# Patient Record
Sex: MALE | Race: WHITE | Employment: OTHER | URBAN - METROPOLITAN AREA
[De-identification: names, ages, dates, MRNs, and addresses within clinical notes are randomized per-mention and may not be internally consistent; named-entity substitution may affect disease eponyms.]

---

## 2019-09-30 ENCOUNTER — HOSPITAL ENCOUNTER (EMERGENCY)
Age: 84
Discharge: HOME | End: 2019-09-30
Attending: EMERGENCY MEDICINE
Payer: MEDICARE

## 2019-09-30 VITALS
HEART RATE: 68 BPM | BODY MASS INDEX: 27.92 KG/M2 | OXYGEN SATURATION: 97 % | HEIGHT: 70 IN | TEMPERATURE: 98 F | WEIGHT: 195 LBS | DIASTOLIC BLOOD PRESSURE: 65 MMHG | RESPIRATION RATE: 16 BRPM | SYSTOLIC BLOOD PRESSURE: 128 MMHG

## 2019-09-30 DIAGNOSIS — S41.112A LACERATION OF ARM, LEFT, INITIAL ENCOUNTER: Primary | ICD-10-CM

## 2019-09-30 DIAGNOSIS — S41.112A SKIN TEAR OF LEFT UPPER EXTREMITY: ICD-10-CM

## 2019-09-30 PROCEDURE — 99282 EMERGENCY DEPT VISIT SF MDM: CPT

## 2019-09-30 PROCEDURE — 12004 RPR S/N/AX/GEN/TRK7.6-12.5CM: CPT

## 2019-09-30 RX ORDER — LIDOCAINE HYDROCHLORIDE AND EPINEPHRINE 10; 10 MG/ML; UG/ML
20 INJECTION, SOLUTION INFILTRATION; PERINEURAL ONCE
Status: DISCONTINUED | OUTPATIENT
Start: 2019-09-30 | End: 2019-09-30 | Stop reason: HOSPADM

## 2019-09-30 ASSESSMENT — PAIN DESCRIPTION - LOCATION: LOCATION: ARM

## 2019-09-30 ASSESSMENT — PAIN DESCRIPTION - PAIN TYPE: TYPE: ACUTE PAIN

## 2019-09-30 ASSESSMENT — PAIN DESCRIPTION - ORIENTATION: ORIENTATION: LEFT;LOWER

## 2019-09-30 ASSESSMENT — PAIN DESCRIPTION - DESCRIPTORS: DESCRIPTORS: THROBBING

## 2019-09-30 ASSESSMENT — PAIN SCALES - GENERAL: PAINLEVEL_OUTOF10: 5

## 2020-06-17 ENCOUNTER — HOSPITAL ENCOUNTER (OUTPATIENT)
Dept: GENERAL RADIOLOGY | Age: 85
Discharge: HOME | End: 2020-06-19
Payer: MEDICARE

## 2020-06-17 ENCOUNTER — HOSPITAL ENCOUNTER (OUTPATIENT)
Age: 85
Discharge: HOME | End: 2020-06-19
Payer: MEDICARE

## 2020-06-17 PROCEDURE — 71100 X-RAY EXAM RIBS UNI 2 VIEWS: CPT

## 2020-07-31 ENCOUNTER — HOSPITAL ENCOUNTER (OUTPATIENT)
Dept: CT IMAGING | Age: 85
Discharge: HOME | End: 2020-08-02
Payer: MEDICARE

## 2020-07-31 ENCOUNTER — HOSPITAL ENCOUNTER (OUTPATIENT)
Dept: GENERAL RADIOLOGY | Age: 85
Discharge: HOME | End: 2020-08-02
Payer: MEDICARE

## 2020-07-31 ENCOUNTER — HOSPITAL ENCOUNTER (OUTPATIENT)
Age: 85
Discharge: HOME | End: 2020-08-02
Payer: MEDICARE

## 2020-07-31 PROCEDURE — 70450 CT HEAD/BRAIN W/O DYE: CPT

## 2020-07-31 PROCEDURE — 71100 X-RAY EXAM RIBS UNI 2 VIEWS: CPT

## 2021-01-20 ENCOUNTER — IMMUNIZATION (OUTPATIENT)
Dept: PRIMARY CARE CLINIC | Age: 86
End: 2021-01-20
Payer: MEDICARE

## 2021-01-20 PROCEDURE — 91300 COVID-19, PFIZER VACCINE 30MCG/0.3ML DOSE: CPT | Performed by: NURSE PRACTITIONER

## 2021-01-20 PROCEDURE — 0001A COVID-19, PFIZER VACCINE 30MCG/0.3ML DOSE: CPT | Performed by: NURSE PRACTITIONER

## 2021-02-10 ENCOUNTER — IMMUNIZATION (OUTPATIENT)
Dept: PRIMARY CARE CLINIC | Age: 86
End: 2021-02-10
Payer: MEDICARE

## 2021-02-10 PROCEDURE — 91300 COVID-19, PFIZER VACCINE 30MCG/0.3ML DOSE: CPT | Performed by: NURSE PRACTITIONER

## 2021-02-10 PROCEDURE — 0002A COVID-19, PFIZER VACCINE 30MCG/0.3ML DOSE: CPT | Performed by: NURSE PRACTITIONER

## 2022-04-14 ENCOUNTER — IMMUNIZATION (OUTPATIENT)
Dept: PRIMARY CARE CLINIC | Age: 87
End: 2022-04-14
Payer: MEDICARE

## 2022-04-14 PROCEDURE — 91305 COVID-19, PFIZER GRAY TOP, DO NOT DILUTE, TRIS-SUCROSE, 12+ YRS, PF, 30MCG/ 0.3 ML DOSE: CPT | Performed by: FAMILY MEDICINE

## 2022-04-14 PROCEDURE — 0054A COVID-19, PFIZER GRAY TOP, DO NOT DILUTE, TRIS-SUCROSE, 12+ YRS, PF, 30MCG/ 0.3 ML DOSE: CPT | Performed by: FAMILY MEDICINE

## 2023-01-01 ENCOUNTER — APPOINTMENT (OUTPATIENT)
Dept: GENERAL RADIOLOGY | Age: 88
DRG: 023 | End: 2023-01-01
Payer: MEDICARE

## 2023-01-01 ENCOUNTER — APPOINTMENT (OUTPATIENT)
Dept: CT IMAGING | Age: 88
DRG: 023 | End: 2023-01-01
Payer: MEDICARE

## 2023-01-01 ENCOUNTER — APPOINTMENT (OUTPATIENT)
Dept: INTERVENTIONAL RADIOLOGY/VASCULAR | Age: 88
DRG: 023 | End: 2023-01-01
Payer: MEDICARE

## 2023-01-01 ENCOUNTER — HOSPITAL ENCOUNTER (INPATIENT)
Age: 88
LOS: 5 days | DRG: 023 | End: 2023-03-30
Attending: EMERGENCY MEDICINE | Admitting: INTERNAL MEDICINE
Payer: MEDICARE

## 2023-01-01 ENCOUNTER — APPOINTMENT (OUTPATIENT)
Dept: MRI IMAGING | Age: 88
DRG: 023 | End: 2023-01-01
Payer: MEDICARE

## 2023-01-01 VITALS
TEMPERATURE: 100.3 F | HEART RATE: 89 BPM | BODY MASS INDEX: 29.76 KG/M2 | HEIGHT: 70 IN | RESPIRATION RATE: 30 BRPM | DIASTOLIC BLOOD PRESSURE: 56 MMHG | OXYGEN SATURATION: 96 % | WEIGHT: 207.89 LBS | SYSTOLIC BLOOD PRESSURE: 164 MMHG

## 2023-01-01 DIAGNOSIS — I63.12 EMBOLIC STROKE INVOLVING BASILAR ARTERY (HCC): ICD-10-CM

## 2023-01-01 DIAGNOSIS — I63.219 VERTEBRAL ARTERY STROKE (HCC): Primary | ICD-10-CM

## 2023-01-01 LAB
AADO2: 121.1 MMHG
AADO2: 133.5 MMHG
AADO2: 144.9 MMHG
AADO2: 152.7 MMHG
AADO2: 88.3 MMHG
AADO2: 90.9 MMHG
ABO + RH BLD: NORMAL
ALBUMIN SERPL-MCNC: 2.7 G/DL (ref 3.5–5.2)
ALBUMIN SERPL-MCNC: 2.9 G/DL (ref 3.5–5.2)
ALBUMIN SERPL-MCNC: 3 G/DL (ref 3.5–5.2)
ALBUMIN SERPL-MCNC: 3.1 G/DL (ref 3.5–5.2)
ALBUMIN SERPL-MCNC: 3.3 G/DL (ref 3.5–5.2)
ALBUMIN SERPL-MCNC: 4.1 G/DL (ref 3.5–5.2)
ALP SERPL-CCNC: 64 U/L (ref 40–129)
ALP SERPL-CCNC: 70 U/L (ref 40–129)
ALP SERPL-CCNC: 72 U/L (ref 40–129)
ALP SERPL-CCNC: 90 U/L (ref 40–129)
ALP SERPL-CCNC: 91 U/L (ref 40–129)
ALP SERPL-CCNC: 93 U/L (ref 40–129)
ALT SERPL-CCNC: 11 U/L (ref 0–40)
ALT SERPL-CCNC: 13 U/L (ref 0–40)
ALT SERPL-CCNC: 13 U/L (ref 0–40)
ALT SERPL-CCNC: 16 U/L (ref 0–40)
ALT SERPL-CCNC: 30 U/L (ref 0–40)
ALT SERPL-CCNC: 40 U/L (ref 0–40)
ANION GAP SERPL CALCULATED.3IONS-SCNC: 10 MMOL/L (ref 7–16)
ANION GAP SERPL CALCULATED.3IONS-SCNC: 12 MMOL/L (ref 7–16)
ANION GAP SERPL CALCULATED.3IONS-SCNC: 7 MMOL/L (ref 7–16)
ANION GAP SERPL CALCULATED.3IONS-SCNC: 8 MMOL/L (ref 7–16)
ANION GAP SERPL CALCULATED.3IONS-SCNC: 8 MMOL/L (ref 7–16)
ANION GAP SERPL CALCULATED.3IONS-SCNC: 9 MMOL/L (ref 7–16)
ANISOCYTOSIS: ABNORMAL
ANISOCYTOSIS: ABNORMAL
APTT BLD: 25.3 SEC (ref 24.5–35.1)
AST SERPL-CCNC: 15 U/L (ref 0–39)
AST SERPL-CCNC: 16 U/L (ref 0–39)
AST SERPL-CCNC: 17 U/L (ref 0–39)
AST SERPL-CCNC: 19 U/L (ref 0–39)
AST SERPL-CCNC: 36 U/L (ref 0–39)
AST SERPL-CCNC: 38 U/L (ref 0–39)
B.E.: -0.9 MMOL/L (ref -3–3)
B.E.: -1.8 MMOL/L (ref -3–3)
B.E.: -2.1 MMOL/L (ref -3–3)
B.E.: -2.6 MMOL/L (ref -3–3)
B.E.: -4.4 MMOL/L (ref -3–3)
B.E.: 1.1 MMOL/L (ref -3–3)
BASOPHILS # BLD: 0 E9/L (ref 0–0.2)
BASOPHILS # BLD: 0 E9/L (ref 0–0.2)
BASOPHILS # BLD: 0.03 E9/L (ref 0–0.2)
BASOPHILS # BLD: 0.03 E9/L (ref 0–0.2)
BASOPHILS # BLD: 0.04 E9/L (ref 0–0.2)
BASOPHILS # BLD: 0.07 E9/L (ref 0–0.2)
BASOPHILS NFR BLD: 0.2 % (ref 0–2)
BASOPHILS NFR BLD: 0.2 % (ref 0–2)
BASOPHILS NFR BLD: 0.3 % (ref 0–2)
BASOPHILS NFR BLD: 0.4 % (ref 0–2)
BASOPHILS NFR BLD: 0.8 % (ref 0–2)
BASOPHILS NFR BLD: 1 % (ref 0–2)
BILIRUB SERPL-MCNC: 0.8 MG/DL (ref 0–1.2)
BILIRUB SERPL-MCNC: 0.9 MG/DL (ref 0–1.2)
BILIRUB SERPL-MCNC: 0.9 MG/DL (ref 0–1.2)
BILIRUB SERPL-MCNC: 1.3 MG/DL (ref 0–1.2)
BLD GP AB SCN SERPL QL: NORMAL
BUN SERPL-MCNC: 18 MG/DL (ref 6–23)
BUN SERPL-MCNC: 19 MG/DL (ref 6–23)
BUN SERPL-MCNC: 23 MG/DL (ref 6–23)
BUN SERPL-MCNC: 32 MG/DL (ref 6–23)
BUN SERPL-MCNC: 37 MG/DL (ref 6–23)
BUN SERPL-MCNC: 42 MG/DL (ref 6–23)
BURR CELLS: ABNORMAL
CA-I BLD-SCNC: 1.21 MMOL/L (ref 1.15–1.33)
CA-I BLD-SCNC: 1.27 MMOL/L (ref 1.15–1.33)
CA-I BLD-SCNC: 1.34 MMOL/L (ref 1.15–1.33)
CA-I BLD-SCNC: 1.34 MMOL/L (ref 1.15–1.33)
CA-I BLD-SCNC: 1.35 MMOL/L (ref 1.15–1.33)
CALCIUM SERPL-MCNC: 8.4 MG/DL (ref 8.6–10.2)
CALCIUM SERPL-MCNC: 8.4 MG/DL (ref 8.6–10.2)
CALCIUM SERPL-MCNC: 8.6 MG/DL (ref 8.6–10.2)
CALCIUM SERPL-MCNC: 8.8 MG/DL (ref 8.6–10.2)
CALCIUM SERPL-MCNC: 8.8 MG/DL (ref 8.6–10.2)
CALCIUM SERPL-MCNC: 9.3 MG/DL (ref 8.6–10.2)
CHLORIDE SERPL-SCNC: 104 MMOL/L (ref 98–107)
CHLORIDE SERPL-SCNC: 110 MMOL/L (ref 98–107)
CHLORIDE SERPL-SCNC: 112 MMOL/L (ref 98–107)
CHLORIDE SERPL-SCNC: 117 MMOL/L (ref 98–107)
CHLORIDE SERPL-SCNC: 120 MMOL/L (ref 98–107)
CHLORIDE SERPL-SCNC: 123 MMOL/L (ref 98–107)
CHOLESTEROL, TOTAL: 131 MG/DL (ref 0–199)
CO2 SERPL-SCNC: 20 MMOL/L (ref 22–29)
CO2 SERPL-SCNC: 20 MMOL/L (ref 22–29)
CO2 SERPL-SCNC: 21 MMOL/L (ref 22–29)
CO2 SERPL-SCNC: 24 MMOL/L (ref 22–29)
CO2 SERPL-SCNC: 24 MMOL/L (ref 22–29)
CO2 SERPL-SCNC: 26 MMOL/L (ref 22–29)
COHB: 0.1 % (ref 0–1.5)
COHB: 0.1 % (ref 0–1.5)
COHB: 0.3 % (ref 0–1.5)
COHB: 0.7 % (ref 0–1.5)
COHB: 0.7 % (ref 0–1.5)
COHB: 1 % (ref 0–1.5)
CREAT SERPL-MCNC: 0.9 MG/DL (ref 0.7–1.2)
CREAT SERPL-MCNC: 1 MG/DL (ref 0.7–1.2)
CRITICAL: ABNORMAL
DATE ANALYZED: ABNORMAL
DATE OF COLLECTION: ABNORMAL
EKG ATRIAL RATE: 73 BPM
EKG P AXIS: 49 DEGREES
EKG P-R INTERVAL: 192 MS
EKG Q-T INTERVAL: 404 MS
EKG QRS DURATION: 86 MS
EKG QTC CALCULATION (BAZETT): 445 MS
EKG R AXIS: 2 DEGREES
EKG T AXIS: 40 DEGREES
EKG VENTRICULAR RATE: 73 BPM
EOSINOPHIL # BLD: 0 E9/L (ref 0.05–0.5)
EOSINOPHIL # BLD: 0.01 E9/L (ref 0.05–0.5)
EOSINOPHIL # BLD: 0.11 E9/L (ref 0.05–0.5)
EOSINOPHIL NFR BLD: 0 % (ref 0–6)
EOSINOPHIL NFR BLD: 0.1 % (ref 0–6)
EOSINOPHIL NFR BLD: 1.5 % (ref 0–6)
ERYTHROCYTE [DISTWIDTH] IN BLOOD BY AUTOMATED COUNT: 13.2 FL (ref 11.5–15)
ERYTHROCYTE [DISTWIDTH] IN BLOOD BY AUTOMATED COUNT: 13.6 FL (ref 11.5–15)
ERYTHROCYTE [DISTWIDTH] IN BLOOD BY AUTOMATED COUNT: 14.2 FL (ref 11.5–15)
ERYTHROCYTE [DISTWIDTH] IN BLOOD BY AUTOMATED COUNT: 14.6 FL (ref 11.5–15)
ERYTHROCYTE [DISTWIDTH] IN BLOOD BY AUTOMATED COUNT: 14.7 FL (ref 11.5–15)
ERYTHROCYTE [DISTWIDTH] IN BLOOD BY AUTOMATED COUNT: 15 FL (ref 11.5–15)
FIO2: 40 %
FIO2: 50 %
GLUCOSE BLD-MCNC: 140 MG/DL
GLUCOSE SERPL-MCNC: 133 MG/DL (ref 74–99)
GLUCOSE SERPL-MCNC: 141 MG/DL (ref 74–99)
GLUCOSE SERPL-MCNC: 144 MG/DL (ref 74–99)
GLUCOSE SERPL-MCNC: 145 MG/DL (ref 74–99)
GLUCOSE SERPL-MCNC: 159 MG/DL (ref 74–99)
GLUCOSE SERPL-MCNC: 173 MG/DL (ref 74–99)
HBA1C MFR BLD: 5.3 % (ref 4–5.6)
HCO3: 20.6 MMOL/L (ref 22–26)
HCO3: 20.7 MMOL/L (ref 22–26)
HCO3: 21.1 MMOL/L (ref 22–26)
HCO3: 21.7 MMOL/L (ref 22–26)
HCO3: 22.5 MMOL/L (ref 22–26)
HCO3: 24.1 MMOL/L (ref 22–26)
HCT VFR BLD AUTO: 31.8 % (ref 37–54)
HCT VFR BLD AUTO: 33.5 % (ref 37–54)
HCT VFR BLD AUTO: 34.4 % (ref 37–54)
HCT VFR BLD AUTO: 35.3 % (ref 37–54)
HCT VFR BLD AUTO: 36.3 % (ref 37–54)
HCT VFR BLD AUTO: 44 % (ref 37–54)
HDLC SERPL-MCNC: 41 MG/DL
HGB BLD-MCNC: 10.4 G/DL (ref 12.5–16.5)
HGB BLD-MCNC: 10.9 G/DL (ref 12.5–16.5)
HGB BLD-MCNC: 11.4 G/DL (ref 12.5–16.5)
HGB BLD-MCNC: 11.9 G/DL (ref 12.5–16.5)
HGB BLD-MCNC: 12.4 G/DL (ref 12.5–16.5)
HGB BLD-MCNC: 15 G/DL (ref 12.5–16.5)
HHB: 1.1 % (ref 0–5)
HHB: 1.3 % (ref 0–5)
HHB: 1.5 % (ref 0–5)
HHB: 1.8 % (ref 0–5)
HHB: 3.1 % (ref 0–5)
HHB: 3.3 % (ref 0–5)
IMM GRANULOCYTES # BLD: 0.05 E9/L
IMM GRANULOCYTES # BLD: 0.09 E9/L
IMM GRANULOCYTES # BLD: 0.15 E9/L
IMM GRANULOCYTES # BLD: 0.17 E9/L
IMM GRANULOCYTES NFR BLD: 0.6 % (ref 0–5)
IMM GRANULOCYTES NFR BLD: 0.7 % (ref 0–5)
IMM GRANULOCYTES NFR BLD: 1.3 % (ref 0–5)
IMM GRANULOCYTES NFR BLD: 1.7 % (ref 0–5)
INR BLD: 1.1
LAB: ABNORMAL
LDLC SERPL CALC-MCNC: 73 MG/DL (ref 0–99)
LV EF: 65 %
LVEF MODALITY: NORMAL
LYMPHOCYTES # BLD: 0.32 E9/L (ref 1.5–4)
LYMPHOCYTES # BLD: 0.43 E9/L (ref 1.5–4)
LYMPHOCYTES # BLD: 0.44 E9/L (ref 1.5–4)
LYMPHOCYTES # BLD: 0.53 E9/L (ref 1.5–4)
LYMPHOCYTES # BLD: 0.7 E9/L (ref 1.5–4)
LYMPHOCYTES # BLD: 1.74 E9/L (ref 1.5–4)
LYMPHOCYTES NFR BLD: 1.7 % (ref 20–42)
LYMPHOCYTES NFR BLD: 2.6 % (ref 20–42)
LYMPHOCYTES NFR BLD: 24.2 % (ref 20–42)
LYMPHOCYTES NFR BLD: 3.6 % (ref 20–42)
LYMPHOCYTES NFR BLD: 4.5 % (ref 20–42)
LYMPHOCYTES NFR BLD: 4.7 % (ref 20–42)
Lab: ABNORMAL
MAGNESIUM SERPL-MCNC: 1.9 MG/DL (ref 1.6–2.6)
MAGNESIUM SERPL-MCNC: 2 MG/DL (ref 1.6–2.6)
MAGNESIUM SERPL-MCNC: 2.2 MG/DL (ref 1.6–2.6)
MAGNESIUM SERPL-MCNC: 2.2 MG/DL (ref 1.6–2.6)
MCH RBC QN AUTO: 32.2 PG (ref 26–35)
MCH RBC QN AUTO: 32.3 PG (ref 26–35)
MCH RBC QN AUTO: 32.4 PG (ref 26–35)
MCH RBC QN AUTO: 32.4 PG (ref 26–35)
MCH RBC QN AUTO: 32.5 PG (ref 26–35)
MCH RBC QN AUTO: 32.7 PG (ref 26–35)
MCHC RBC AUTO-ENTMCNC: 32.5 % (ref 32–34.5)
MCHC RBC AUTO-ENTMCNC: 32.7 % (ref 32–34.5)
MCHC RBC AUTO-ENTMCNC: 33.1 % (ref 32–34.5)
MCHC RBC AUTO-ENTMCNC: 33.7 % (ref 32–34.5)
MCHC RBC AUTO-ENTMCNC: 34.1 % (ref 32–34.5)
MCHC RBC AUTO-ENTMCNC: 34.2 % (ref 32–34.5)
MCV RBC AUTO: 94.4 FL (ref 80–99.9)
MCV RBC AUTO: 95 FL (ref 80–99.9)
MCV RBC AUTO: 96.2 FL (ref 80–99.9)
MCV RBC AUTO: 98.6 FL (ref 80–99.9)
MCV RBC AUTO: 99.1 FL (ref 80–99.9)
MCV RBC AUTO: 99.4 FL (ref 80–99.9)
METAMYELOCYTES NFR BLD MANUAL: 0.9 % (ref 0–1)
METER GLUCOSE: 140 MG/DL (ref 74–99)
METHB: 0.3 % (ref 0–1.5)
METHB: 0.3 % (ref 0–1.5)
METHB: 0.4 % (ref 0–1.5)
METHB: 0.4 % (ref 0–1.5)
METHB: 0.5 % (ref 0–1.5)
METHB: 0.7 % (ref 0–1.5)
MODE: AC
MONOCYTES # BLD: 0.79 E9/L (ref 0.1–0.95)
MONOCYTES # BLD: 1.12 E9/L (ref 0.1–0.95)
MONOCYTES # BLD: 1.16 E9/L (ref 0.1–0.95)
MONOCYTES # BLD: 1.24 E9/L (ref 0.1–0.95)
MONOCYTES # BLD: 1.34 E9/L (ref 0.1–0.95)
MONOCYTES # BLD: 2.11 E9/L (ref 0.1–0.95)
MONOCYTES NFR BLD: 10.3 % (ref 2–12)
MONOCYTES NFR BLD: 11 % (ref 2–12)
MONOCYTES NFR BLD: 11.9 % (ref 2–12)
MONOCYTES NFR BLD: 12.2 % (ref 2–12)
MONOCYTES NFR BLD: 7 % (ref 2–12)
MONOCYTES NFR BLD: 9 % (ref 2–12)
MYELOCYTES NFR BLD MANUAL: 1.7 % (ref 0–0)
NEUTROPHILS # BLD: 10.15 E9/L (ref 1.8–7.3)
NEUTROPHILS # BLD: 12.73 E9/L (ref 1.8–7.3)
NEUTROPHILS # BLD: 14.56 E9/L (ref 1.8–7.3)
NEUTROPHILS # BLD: 14.96 E9/L (ref 1.8–7.3)
NEUTROPHILS # BLD: 4.42 E9/L (ref 1.8–7.3)
NEUTROPHILS # BLD: 7.9 E9/L (ref 1.8–7.3)
NEUTS SEG NFR BLD: 61.6 % (ref 43–80)
NEUTS SEG NFR BLD: 81.4 % (ref 43–80)
NEUTS SEG NFR BLD: 84.5 % (ref 43–80)
NEUTS SEG NFR BLD: 85.2 % (ref 43–80)
NEUTS SEG NFR BLD: 85.5 % (ref 43–80)
NEUTS SEG NFR BLD: 88.7 % (ref 43–80)
O2 CONTENT: 15.7 ML/DL
O2 CONTENT: 16.2 ML/DL
O2 CONTENT: 16.3 ML/DL
O2 CONTENT: 17.8 ML/DL
O2 CONTENT: 17.8 ML/DL
O2 CONTENT: 18.6 ML/DL
O2 SATURATION: 96.7 % (ref 92–98.5)
O2 SATURATION: 96.9 % (ref 92–98.5)
O2 SATURATION: 98.2 % (ref 92–98.5)
O2 SATURATION: 98.5 % (ref 92–98.5)
O2 SATURATION: 98.7 % (ref 92–98.5)
O2 SATURATION: 98.9 % (ref 92–98.5)
O2HB: 95.7 % (ref 94–97)
O2HB: 96.2 % (ref 94–97)
O2HB: 97.1 % (ref 94–97)
O2HB: 97.4 % (ref 94–97)
O2HB: 97.9 % (ref 94–97)
O2HB: 98.1 % (ref 94–97)
OPERATOR ID: 1632
OPERATOR ID: 2244
OPERATOR ID: 789
OPERATOR ID: ABNORMAL
OVALOCYTES: ABNORMAL
PATIENT TEMP: 37 C
PCO2: 30 MMHG (ref 35–45)
PCO2: 32.8 MMHG (ref 35–45)
PCO2: 33.2 MMHG (ref 35–45)
PCO2: 33.4 MMHG (ref 35–45)
PCO2: 33.4 MMHG (ref 35–45)
PCO2: 37.6 MMHG (ref 35–45)
PEEP/CPAP: 5 CMH2O
PEEP/CPAP: 8 CMH2O
PFO2: 2.1 MMHG/%
PFO2: 2.3 MMHG/%
PFO2: 2.91 MMHG/%
PFO2: 3.36 MMHG/%
PFO2: 3.72 MMHG/%
PFO2: 3.75 MMHG/%
PH BLOOD GAS: 7.36 (ref 7.35–7.45)
PH BLOOD GAS: 7.42 (ref 7.35–7.45)
PH BLOOD GAS: 7.44 (ref 7.35–7.45)
PH BLOOD GAS: 7.45 (ref 7.35–7.45)
PH BLOOD GAS: 7.46 (ref 7.35–7.45)
PH BLOOD GAS: 7.48 (ref 7.35–7.45)
PHOSPHATE SERPL-MCNC: 1.4 MG/DL (ref 2.5–4.5)
PHOSPHATE SERPL-MCNC: 2.2 MG/DL (ref 2.5–4.5)
PHOSPHATE SERPL-MCNC: 3.1 MG/DL (ref 2.5–4.5)
PHOSPHATE SERPL-MCNC: 3.2 MG/DL (ref 2.5–4.5)
PHOSPHATE SERPL-MCNC: 3.2 MG/DL (ref 2.5–4.5)
PLATELET # BLD AUTO: 137 E9/L (ref 130–450)
PLATELET # BLD AUTO: 161 E9/L (ref 130–450)
PLATELET # BLD AUTO: 177 E9/L (ref 130–450)
PLATELET # BLD AUTO: 183 E9/L (ref 130–450)
PLATELET # BLD AUTO: 192 E9/L (ref 130–450)
PLATELET # BLD AUTO: 212 E9/L (ref 130–450)
PMV BLD AUTO: 11.9 FL (ref 7–12)
PMV BLD AUTO: 11.9 FL (ref 7–12)
PMV BLD AUTO: 12.2 FL (ref 7–12)
PMV BLD AUTO: 12.3 FL (ref 7–12)
PO2: 116.4 MMHG (ref 75–100)
PO2: 148.7 MMHG (ref 75–100)
PO2: 149.8 MMHG (ref 75–100)
PO2: 168.2 MMHG (ref 75–100)
PO2: 84.1 MMHG (ref 75–100)
PO2: 91.9 MMHG (ref 75–100)
POIKILOCYTES: ABNORMAL
POLYCHROMASIA: ABNORMAL
POTASSIUM SERPL-SCNC: 3.5 MMOL/L (ref 3.5–5)
POTASSIUM SERPL-SCNC: 3.5 MMOL/L (ref 3.5–5)
POTASSIUM SERPL-SCNC: 3.7 MMOL/L (ref 3.5–5)
POTASSIUM SERPL-SCNC: 3.7 MMOL/L (ref 3.5–5)
POTASSIUM SERPL-SCNC: 3.9 MMOL/L (ref 3.5–5)
POTASSIUM SERPL-SCNC: 4 MMOL/L (ref 3.5–5)
PROCALCITONIN: 0.4 NG/ML (ref 0–0.08)
PROT SERPL-MCNC: 4.8 G/DL (ref 6.4–8.3)
PROT SERPL-MCNC: 5.1 G/DL (ref 6.4–8.3)
PROT SERPL-MCNC: 5.1 G/DL (ref 6.4–8.3)
PROT SERPL-MCNC: 5.2 G/DL (ref 6.4–8.3)
PROT SERPL-MCNC: 5.2 G/DL (ref 6.4–8.3)
PROT SERPL-MCNC: 6.2 G/DL (ref 6.4–8.3)
PROTHROMBIN TIME: 11.7 SEC (ref 9.3–12.4)
RBC # BLD AUTO: 3.21 E12/L (ref 3.8–5.8)
RBC # BLD AUTO: 3.37 E12/L (ref 3.8–5.8)
RBC # BLD AUTO: 3.49 E12/L (ref 3.8–5.8)
RBC # BLD AUTO: 3.67 E12/L (ref 3.8–5.8)
RBC # BLD AUTO: 3.82 E12/L (ref 3.8–5.8)
RBC # BLD AUTO: 4.66 E12/L (ref 3.8–5.8)
RI(T): 0.59
RI(T): 0.61
RI(T): 0.79
RI(T): 1.04
RI(T): 1.58
RI(T): 1.82
RR MECHANICAL: 14 B/MIN
RR MECHANICAL: 16 B/MIN
SODIUM SERPL-SCNC: 139 MMOL/L (ref 132–146)
SODIUM SERPL-SCNC: 142 MMOL/L (ref 132–146)
SODIUM SERPL-SCNC: 142 MMOL/L (ref 132–146)
SODIUM SERPL-SCNC: 146 MMOL/L (ref 132–146)
SODIUM SERPL-SCNC: 146 MMOL/L (ref 132–146)
SODIUM SERPL-SCNC: 147 MMOL/L (ref 132–146)
SODIUM SERPL-SCNC: 148 MMOL/L (ref 132–146)
SODIUM SERPL-SCNC: 150 MMOL/L (ref 132–146)
SODIUM SERPL-SCNC: 151 MMOL/L (ref 132–146)
SODIUM SERPL-SCNC: 151 MMOL/L (ref 132–146)
SODIUM SERPL-SCNC: 155 MMOL/L (ref 132–146)
SODIUM SERPL-SCNC: 155 MMOL/L (ref 132–146)
SODIUM SERPL-SCNC: 157 MMOL/L (ref 132–146)
SODIUM SERPL-SCNC: 159 MMOL/L (ref 132–146)
SOURCE, BLOOD GAS: ABNORMAL
TARGET CELLS: ABNORMAL
THB: 11.5 G/DL (ref 11.5–16.5)
THB: 11.8 G/DL (ref 11.5–16.5)
THB: 12 G/DL (ref 11.5–16.5)
THB: 12.7 G/DL (ref 11.5–16.5)
THB: 12.8 G/DL (ref 11.5–16.5)
THB: 13.3 G/DL (ref 11.5–16.5)
TIME ANALYZED: 1423
TIME ANALYZED: 436
TIME ANALYZED: 505
TIME ANALYZED: 529
TIME ANALYZED: 545
TIME ANALYZED: 623
TRIGL SERPL-MCNC: 69 MG/DL (ref 0–149)
TRIGL SERPL-MCNC: 83 MG/DL (ref 0–149)
TROPONIN, HIGH SENSITIVITY: 20 NG/L (ref 0–11)
VLDLC SERPL CALC-MCNC: 17 MG/DL
VT MECHANICAL: 500 ML
WBC # BLD: 12 E9/L (ref 4.5–11.5)
WBC # BLD: 14.9 E9/L (ref 4.5–11.5)
WBC # BLD: 16 E9/L (ref 4.5–11.5)
WBC # BLD: 17.6 E9/L (ref 4.5–11.5)
WBC # BLD: 7.2 E9/L (ref 4.5–11.5)
WBC # BLD: 9.7 E9/L (ref 4.5–11.5)

## 2023-01-01 PROCEDURE — 2580000003 HC RX 258: Performed by: NURSE PRACTITIONER

## 2023-01-01 PROCEDURE — 37799 UNLISTED PX VASCULAR SURGERY: CPT

## 2023-01-01 PROCEDURE — 83735 ASSAY OF MAGNESIUM: CPT

## 2023-01-01 PROCEDURE — 94003 VENT MGMT INPAT SUBQ DAY: CPT

## 2023-01-01 PROCEDURE — 2580000003 HC RX 258: Performed by: PSYCHIATRY & NEUROLOGY

## 2023-01-01 PROCEDURE — C1874 STENT, COATED/COV W/DEL SYS: HCPCS

## 2023-01-01 PROCEDURE — 4A03X5D MEASUREMENT OF ARTERIAL FLOW, INTRACRANIAL, EXTERNAL APPROACH: ICD-10-PCS | Performed by: PSYCHIATRY & NEUROLOGY

## 2023-01-01 PROCEDURE — 2500000003 HC RX 250 WO HCPCS: Performed by: NURSE PRACTITIONER

## 2023-01-01 PROCEDURE — 85730 THROMBOPLASTIN TIME PARTIAL: CPT

## 2023-01-01 PROCEDURE — 80053 COMPREHEN METABOLIC PANEL: CPT

## 2023-01-01 PROCEDURE — 6360000002 HC RX W HCPCS: Performed by: PSYCHIATRY & NEUROLOGY

## 2023-01-01 PROCEDURE — 82330 ASSAY OF CALCIUM: CPT

## 2023-01-01 PROCEDURE — 6360000002 HC RX W HCPCS: Performed by: ANESTHESIOLOGY

## 2023-01-01 PROCEDURE — 99291 CRITICAL CARE FIRST HOUR: CPT | Performed by: NURSE PRACTITIONER

## 2023-01-01 PROCEDURE — 84100 ASSAY OF PHOSPHORUS: CPT

## 2023-01-01 PROCEDURE — 2500000003 HC RX 250 WO HCPCS: Performed by: PSYCHIATRY & NEUROLOGY

## 2023-01-01 PROCEDURE — 6360000002 HC RX W HCPCS

## 2023-01-01 PROCEDURE — A4216 STERILE WATER/SALINE, 10 ML: HCPCS | Performed by: NURSE PRACTITIONER

## 2023-01-01 PROCEDURE — 84478 ASSAY OF TRIGLYCERIDES: CPT

## 2023-01-01 PROCEDURE — 6370000000 HC RX 637 (ALT 250 FOR IP): Performed by: PSYCHIATRY & NEUROLOGY

## 2023-01-01 PROCEDURE — 36226 PLACE CATH VERTEBRAL ART: CPT | Performed by: PSYCHIATRY & NEUROLOGY

## 2023-01-01 PROCEDURE — 2500000003 HC RX 250 WO HCPCS: Performed by: CLINICAL NURSE SPECIALIST

## 2023-01-01 PROCEDURE — 36569 INSJ PICC 5 YR+ W/O IMAGING: CPT

## 2023-01-01 PROCEDURE — 85610 PROTHROMBIN TIME: CPT

## 2023-01-01 PROCEDURE — 82805 BLOOD GASES W/O2 SATURATION: CPT

## 2023-01-01 PROCEDURE — 6370000000 HC RX 637 (ALT 250 FOR IP): Performed by: CLINICAL NURSE SPECIALIST

## 2023-01-01 PROCEDURE — 70553 MRI BRAIN STEM W/O & W/DYE: CPT

## 2023-01-01 PROCEDURE — 87206 SMEAR FLUORESCENT/ACID STAI: CPT

## 2023-01-01 PROCEDURE — 86900 BLOOD TYPING SEROLOGIC ABO: CPT

## 2023-01-01 PROCEDURE — 36415 COLL VENOUS BLD VENIPUNCTURE: CPT

## 2023-01-01 PROCEDURE — 99232 SBSQ HOSP IP/OBS MODERATE 35: CPT

## 2023-01-01 PROCEDURE — 03CG3ZZ EXTIRPATION OF MATTER FROM INTRACRANIAL ARTERY, PERCUTANEOUS APPROACH: ICD-10-PCS | Performed by: PSYCHIATRY & NEUROLOGY

## 2023-01-01 PROCEDURE — 6360000004 HC RX CONTRAST MEDICATION: Performed by: PSYCHIATRY & NEUROLOGY

## 2023-01-01 PROCEDURE — 6360000002 HC RX W HCPCS: Performed by: NURSE PRACTITIONER

## 2023-01-01 PROCEDURE — C1751 CATH, INF, PER/CENT/MIDLINE: HCPCS

## 2023-01-01 PROCEDURE — 99231 SBSQ HOSP IP/OBS SF/LOW 25: CPT | Performed by: PSYCHIATRY & NEUROLOGY

## 2023-01-01 PROCEDURE — 85025 COMPLETE CBC W/AUTO DIFF WBC: CPT

## 2023-01-01 PROCEDURE — 86850 RBC ANTIBODY SCREEN: CPT

## 2023-01-01 PROCEDURE — 86901 BLOOD TYPING SEROLOGIC RH(D): CPT

## 2023-01-01 PROCEDURE — 71045 X-RAY EXAM CHEST 1 VIEW: CPT

## 2023-01-01 PROCEDURE — 6370000000 HC RX 637 (ALT 250 FOR IP): Performed by: NURSE PRACTITIONER

## 2023-01-01 PROCEDURE — 2000000000 HC ICU R&B

## 2023-01-01 PROCEDURE — B31G1ZZ FLUOROSCOPY OF BILATERAL VERTEBRAL ARTERIES USING LOW OSMOLAR CONTRAST: ICD-10-PCS | Performed by: PSYCHIATRY & NEUROLOGY

## 2023-01-01 PROCEDURE — 2580000003 HC RX 258: Performed by: EMERGENCY MEDICINE

## 2023-01-01 PROCEDURE — 6370000000 HC RX 637 (ALT 250 FOR IP): Performed by: SURGERY

## 2023-01-01 PROCEDURE — 6360000002 HC RX W HCPCS: Performed by: CLINICAL NURSE SPECIALIST

## 2023-01-01 PROCEDURE — 70450 CT HEAD/BRAIN W/O DYE: CPT

## 2023-01-01 PROCEDURE — 84295 ASSAY OF SERUM SODIUM: CPT

## 2023-01-01 PROCEDURE — A9577 INJ MULTIHANCE: HCPCS | Performed by: RADIOLOGY

## 2023-01-01 PROCEDURE — 3700000000 HC ANESTHESIA ATTENDED CARE

## 2023-01-01 PROCEDURE — 5A1955Z RESPIRATORY VENTILATION, GREATER THAN 96 CONSECUTIVE HOURS: ICD-10-PCS | Performed by: INTERNAL MEDICINE

## 2023-01-01 PROCEDURE — 94002 VENT MGMT INPAT INIT DAY: CPT

## 2023-01-01 PROCEDURE — 36225 PLACE CATH SUBCLAVIAN ART: CPT

## 2023-01-01 PROCEDURE — 93010 ELECTROCARDIOGRAM REPORT: CPT | Performed by: INTERNAL MEDICINE

## 2023-01-01 PROCEDURE — 74018 RADEX ABDOMEN 1 VIEW: CPT

## 2023-01-01 PROCEDURE — 0BH17EZ INSERTION OF ENDOTRACHEAL AIRWAY INTO TRACHEA, VIA NATURAL OR ARTIFICIAL OPENING: ICD-10-PCS | Performed by: INTERNAL MEDICINE

## 2023-01-01 PROCEDURE — 61645 PERQ ART M-THROMBECT &/NFS: CPT | Performed by: PSYCHIATRY & NEUROLOGY

## 2023-01-01 PROCEDURE — 84484 ASSAY OF TROPONIN QUANT: CPT

## 2023-01-01 PROCEDURE — 99285 EMERGENCY DEPT VISIT HI MDM: CPT

## 2023-01-01 PROCEDURE — 6360000004 HC RX CONTRAST MEDICATION: Performed by: RADIOLOGY

## 2023-01-01 PROCEDURE — 7100000001 HC PACU RECOVERY - ADDTL 15 MIN

## 2023-01-01 PROCEDURE — 93005 ELECTROCARDIOGRAM TRACING: CPT | Performed by: STUDENT IN AN ORGANIZED HEALTH CARE EDUCATION/TRAINING PROGRAM

## 2023-01-01 PROCEDURE — 80061 LIPID PANEL: CPT

## 2023-01-01 PROCEDURE — 76377 3D RENDER W/INTRP POSTPROCES: CPT

## 2023-01-01 PROCEDURE — 6360000002 HC RX W HCPCS: Performed by: SURGERY

## 2023-01-01 PROCEDURE — 7100000000 HC PACU RECOVERY - FIRST 15 MIN

## 2023-01-01 PROCEDURE — 70498 CT ANGIOGRAPHY NECK: CPT

## 2023-01-01 PROCEDURE — 99222 1ST HOSP IP/OBS MODERATE 55: CPT

## 2023-01-01 PROCEDURE — 99284 EMERGENCY DEPT VISIT MOD MDM: CPT | Performed by: PSYCHIATRY & NEUROLOGY

## 2023-01-01 PROCEDURE — 3700000001 HC ADD 15 MINUTES (ANESTHESIA)

## 2023-01-01 PROCEDURE — 87040 BLOOD CULTURE FOR BACTERIA: CPT

## 2023-01-01 PROCEDURE — 61645 PERQ ART M-THROMBECT &/NFS: CPT

## 2023-01-01 PROCEDURE — 84145 PROCALCITONIN (PCT): CPT

## 2023-01-01 PROCEDURE — 87070 CULTURE OTHR SPECIMN AEROBIC: CPT

## 2023-01-01 PROCEDURE — 99232 SBSQ HOSP IP/OBS MODERATE 35: CPT | Performed by: PSYCHIATRY & NEUROLOGY

## 2023-01-01 PROCEDURE — 82962 GLUCOSE BLOOD TEST: CPT

## 2023-01-01 PROCEDURE — C1760 CLOSURE DEV, VASC: HCPCS

## 2023-01-01 PROCEDURE — 76937 US GUIDE VASCULAR ACCESS: CPT

## 2023-01-01 PROCEDURE — 70496 CT ANGIOGRAPHY HEAD: CPT

## 2023-01-01 PROCEDURE — 2709999900 IR MECHANICAL ART THROMBECTOMY INTRACRANIAL

## 2023-01-01 PROCEDURE — 93306 TTE W/DOPPLER COMPLETE: CPT

## 2023-01-01 PROCEDURE — 037Q34Z DILATION OF LEFT VERTEBRAL ARTERY WITH DRUG-ELUTING INTRALUMINAL DEVICE, PERCUTANEOUS APPROACH: ICD-10-PCS | Performed by: PSYCHIATRY & NEUROLOGY

## 2023-01-01 PROCEDURE — 83036 HEMOGLOBIN GLYCOSYLATED A1C: CPT

## 2023-01-01 PROCEDURE — 99231 SBSQ HOSP IP/OBS SF/LOW 25: CPT | Performed by: NURSE PRACTITIONER

## 2023-01-01 PROCEDURE — 0042T CT BRAIN PERFUSION: CPT

## 2023-01-01 RX ORDER — LABETALOL HYDROCHLORIDE 5 MG/ML
5 INJECTION, SOLUTION INTRAVENOUS EVERY 10 MIN PRN
Status: DISCONTINUED | OUTPATIENT
Start: 2023-01-01 | End: 2023-01-01

## 2023-01-01 RX ORDER — FENTANYL CITRATE 50 UG/ML
INJECTION, SOLUTION INTRAMUSCULAR; INTRAVENOUS
Status: COMPLETED | OUTPATIENT
Start: 2023-01-01 | End: 2023-01-01

## 2023-01-01 RX ORDER — LACTULOSE 10 G/15ML
30 SOLUTION ORAL 2 TIMES DAILY
Status: DISCONTINUED | OUTPATIENT
Start: 2023-01-01 | End: 2023-01-01

## 2023-01-01 RX ORDER — SODIUM CHLORIDE 0.9 % (FLUSH) 0.9 %
5-40 SYRINGE (ML) INJECTION PRN
Status: DISCONTINUED | OUTPATIENT
Start: 2023-01-01 | End: 2023-01-01 | Stop reason: SDUPTHER

## 2023-01-01 RX ORDER — AMLODIPINE BESYLATE 5 MG/1
5 TABLET ORAL DAILY
Status: DISCONTINUED | OUTPATIENT
Start: 2023-01-01 | End: 2023-01-01

## 2023-01-01 RX ORDER — SODIUM CHLORIDE 9 MG/ML
INJECTION, SOLUTION INTRAVENOUS PRN
Status: DISCONTINUED | OUTPATIENT
Start: 2023-01-01 | End: 2023-01-01

## 2023-01-01 RX ORDER — SODIUM CHLORIDE 9 MG/ML
25 INJECTION, SOLUTION INTRAVENOUS PRN
Status: DISCONTINUED | OUTPATIENT
Start: 2023-01-01 | End: 2023-01-01 | Stop reason: SDUPTHER

## 2023-01-01 RX ORDER — AMLODIPINE BESYLATE 5 MG/1
5 TABLET ORAL DAILY
COMMUNITY

## 2023-01-01 RX ORDER — HEPARIN SODIUM 10000 [USP'U]/ML
5000 INJECTION, SOLUTION INTRAVENOUS; SUBCUTANEOUS EVERY 8 HOURS SCHEDULED
Status: DISCONTINUED | OUTPATIENT
Start: 2023-01-01 | End: 2023-01-01

## 2023-01-01 RX ORDER — MAGNESIUM SULFATE IN WATER 40 MG/ML
2000 INJECTION, SOLUTION INTRAVENOUS ONCE
Status: COMPLETED | OUTPATIENT
Start: 2023-01-01 | End: 2023-01-01

## 2023-01-01 RX ORDER — SODIUM CHLORIDE 0.9 % (FLUSH) 0.9 %
5-40 SYRINGE (ML) INJECTION EVERY 12 HOURS SCHEDULED
Status: DISCONTINUED | OUTPATIENT
Start: 2023-01-01 | End: 2023-01-01

## 2023-01-01 RX ORDER — HEPARIN SODIUM (PORCINE) LOCK FLUSH IV SOLN 100 UNIT/ML 100 UNIT/ML
3 SOLUTION INTRAVENOUS EVERY 12 HOURS SCHEDULED
Status: DISCONTINUED | OUTPATIENT
Start: 2023-01-01 | End: 2023-01-01

## 2023-01-01 RX ORDER — PROPOFOL 10 MG/ML
10 INJECTION, EMULSION INTRAVENOUS
Status: DISCONTINUED | OUTPATIENT
Start: 2023-01-01 | End: 2023-01-01

## 2023-01-01 RX ORDER — 0.9 % SODIUM CHLORIDE 0.9 %
500 INTRAVENOUS SOLUTION INTRAVENOUS ONCE
Status: COMPLETED | OUTPATIENT
Start: 2023-01-01 | End: 2023-01-01

## 2023-01-01 RX ORDER — MINERAL OIL AND WHITE PETROLATUM 150; 830 MG/G; MG/G
OINTMENT OPHTHALMIC EVERY 4 HOURS
Status: DISCONTINUED | OUTPATIENT
Start: 2023-01-01 | End: 2023-01-01

## 2023-01-01 RX ORDER — MORPHINE SULFATE 4 MG/ML
3 INJECTION, SOLUTION INTRAMUSCULAR; INTRAVENOUS
Status: DISCONTINUED | OUTPATIENT
Start: 2023-01-01 | End: 2023-01-01 | Stop reason: HOSPADM

## 2023-01-01 RX ORDER — ASPIRIN 81 MG/1
81 TABLET, CHEWABLE ORAL DAILY
Status: DISCONTINUED | OUTPATIENT
Start: 2023-01-01 | End: 2023-01-01

## 2023-01-01 RX ORDER — EPTIFIBATIDE 0.75 MG/ML
0.5 INJECTION, SOLUTION INTRAVENOUS CONTINUOUS
Status: DISPENSED | OUTPATIENT
Start: 2023-01-01 | End: 2023-01-01

## 2023-01-01 RX ORDER — ACETAMINOPHEN 325 MG/1
650 TABLET ORAL
Status: DISCONTINUED | OUTPATIENT
Start: 2023-01-01 | End: 2023-01-01

## 2023-01-01 RX ORDER — HYDRALAZINE HYDROCHLORIDE 20 MG/ML
5 INJECTION INTRAMUSCULAR; INTRAVENOUS
Status: DISCONTINUED | OUTPATIENT
Start: 2023-01-01 | End: 2023-01-01

## 2023-01-01 RX ORDER — 3% SODIUM CHLORIDE 3 G/100ML
25 INJECTION, SOLUTION INTRAVENOUS CONTINUOUS
Status: DISPENSED | OUTPATIENT
Start: 2023-01-01 | End: 2023-01-01

## 2023-01-01 RX ORDER — HEPARIN SODIUM 10000 [USP'U]/ML
INJECTION, SOLUTION INTRAVENOUS; SUBCUTANEOUS
Status: COMPLETED | OUTPATIENT
Start: 2023-01-01 | End: 2023-01-01

## 2023-01-01 RX ORDER — ENOXAPARIN SODIUM 100 MG/ML
40 INJECTION SUBCUTANEOUS DAILY
Status: DISCONTINUED | OUTPATIENT
Start: 2023-01-01 | End: 2023-01-01

## 2023-01-01 RX ORDER — MORPHINE SULFATE 2 MG/ML
2 INJECTION, SOLUTION INTRAMUSCULAR; INTRAVENOUS
Status: DISCONTINUED | OUTPATIENT
Start: 2023-01-01 | End: 2023-01-01

## 2023-01-01 RX ORDER — LIDOCAINE HYDROCHLORIDE 20 MG/ML
INJECTION, SOLUTION INFILTRATION; PERINEURAL
Status: COMPLETED | OUTPATIENT
Start: 2023-01-01 | End: 2023-01-01

## 2023-01-01 RX ORDER — DIPHENHYDRAMINE HYDROCHLORIDE 50 MG/ML
12.5 INJECTION INTRAMUSCULAR; INTRAVENOUS
Status: DISCONTINUED | OUTPATIENT
Start: 2023-01-01 | End: 2023-01-01

## 2023-01-01 RX ORDER — FENTANYL CITRATE 50 UG/ML
25 INJECTION, SOLUTION INTRAMUSCULAR; INTRAVENOUS
Status: DISCONTINUED | OUTPATIENT
Start: 2023-01-01 | End: 2023-01-01

## 2023-01-01 RX ORDER — HEPARIN SODIUM (PORCINE) LOCK FLUSH IV SOLN 100 UNIT/ML 100 UNIT/ML
3 SOLUTION INTRAVENOUS PRN
Status: DISCONTINUED | OUTPATIENT
Start: 2023-01-01 | End: 2023-01-01

## 2023-01-01 RX ORDER — HYDRALAZINE HYDROCHLORIDE 20 MG/ML
10 INJECTION INTRAMUSCULAR; INTRAVENOUS EVERY 10 MIN PRN
Status: DISCONTINUED | OUTPATIENT
Start: 2023-01-01 | End: 2023-01-01

## 2023-01-01 RX ORDER — CHLORHEXIDINE GLUCONATE 0.12 MG/ML
15 RINSE ORAL 2 TIMES DAILY
Status: DISCONTINUED | OUTPATIENT
Start: 2023-01-01 | End: 2023-01-01

## 2023-01-01 RX ORDER — SODIUM CHLORIDE 0.9 % (FLUSH) 0.9 %
5-40 SYRINGE (ML) INJECTION PRN
Status: DISCONTINUED | OUTPATIENT
Start: 2023-01-01 | End: 2023-01-01

## 2023-01-01 RX ORDER — MIDAZOLAM HYDROCHLORIDE 2 MG/2ML
2 INJECTION, SOLUTION INTRAMUSCULAR; INTRAVENOUS
Status: DISCONTINUED | OUTPATIENT
Start: 2023-01-01 | End: 2023-01-01

## 2023-01-01 RX ORDER — DROPERIDOL 2.5 MG/ML
0.62 INJECTION, SOLUTION INTRAMUSCULAR; INTRAVENOUS
Status: DISCONTINUED | OUTPATIENT
Start: 2023-01-01 | End: 2023-01-01

## 2023-01-01 RX ORDER — AMLODIPINE BESYLATE 5 MG/1
5 TABLET ORAL ONCE
Status: COMPLETED | OUTPATIENT
Start: 2023-01-01 | End: 2023-01-01

## 2023-01-01 RX ORDER — ACETAMINOPHEN 160 MG/5ML
650 SOLUTION ORAL EVERY 4 HOURS PRN
Status: DISCONTINUED | OUTPATIENT
Start: 2023-01-01 | End: 2023-01-01

## 2023-01-01 RX ORDER — ASPIRIN 300 MG/1
300 SUPPOSITORY RECTAL DAILY
Status: DISCONTINUED | OUTPATIENT
Start: 2023-01-01 | End: 2023-01-01

## 2023-01-01 RX ORDER — ACETAMINOPHEN 325 MG/1
650 TABLET ORAL EVERY 4 HOURS PRN
Status: DISCONTINUED | OUTPATIENT
Start: 2023-01-01 | End: 2023-01-01 | Stop reason: SDUPTHER

## 2023-01-01 RX ORDER — FAMOTIDINE 20 MG/1
20 TABLET, FILM COATED ORAL DAILY
Status: DISCONTINUED | OUTPATIENT
Start: 2023-01-01 | End: 2023-01-01

## 2023-01-01 RX ORDER — POLYETHYLENE GLYCOL 3350 17 G/17G
17 POWDER, FOR SOLUTION ORAL DAILY
Status: DISCONTINUED | OUTPATIENT
Start: 2023-01-01 | End: 2023-01-01

## 2023-01-01 RX ORDER — 3% SODIUM CHLORIDE 3 G/100ML
15 INJECTION, SOLUTION INTRAVENOUS CONTINUOUS
Status: DISCONTINUED | OUTPATIENT
Start: 2023-01-01 | End: 2023-01-01

## 2023-01-01 RX ORDER — BISACODYL 10 MG
10 SUPPOSITORY, RECTAL RECTAL DAILY PRN
Status: DISCONTINUED | OUTPATIENT
Start: 2023-01-01 | End: 2023-01-01

## 2023-01-01 RX ORDER — AMLODIPINE BESYLATE 10 MG/1
10 TABLET ORAL DAILY
Status: DISCONTINUED | OUTPATIENT
Start: 2023-01-01 | End: 2023-01-01

## 2023-01-01 RX ORDER — SODIUM CHLORIDE 9 MG/ML
INJECTION, SOLUTION INTRAVENOUS CONTINUOUS
Status: DISCONTINUED | OUTPATIENT
Start: 2023-01-01 | End: 2023-01-01

## 2023-01-01 RX ORDER — ONDANSETRON 4 MG/1
4 TABLET, ORALLY DISINTEGRATING ORAL EVERY 8 HOURS PRN
Status: DISCONTINUED | OUTPATIENT
Start: 2023-01-01 | End: 2023-01-01

## 2023-01-01 RX ORDER — PROPOFOL 10 MG/ML
INJECTION, EMULSION INTRAVENOUS
Status: COMPLETED
Start: 2023-01-01 | End: 2023-01-01

## 2023-01-01 RX ORDER — ATORVASTATIN CALCIUM 20 MG/1
20 TABLET, FILM COATED ORAL DAILY
Status: DISCONTINUED | OUTPATIENT
Start: 2023-01-01 | End: 2023-01-01

## 2023-01-01 RX ORDER — MIDAZOLAM HYDROCHLORIDE 2 MG/2ML
INJECTION, SOLUTION INTRAMUSCULAR; INTRAVENOUS
Status: COMPLETED | OUTPATIENT
Start: 2023-01-01 | End: 2023-01-01

## 2023-01-01 RX ORDER — LABETALOL HYDROCHLORIDE 5 MG/ML
5 INJECTION, SOLUTION INTRAVENOUS
Status: DISCONTINUED | OUTPATIENT
Start: 2023-01-01 | End: 2023-01-01

## 2023-01-01 RX ORDER — IPRATROPIUM BROMIDE AND ALBUTEROL SULFATE 2.5; .5 MG/3ML; MG/3ML
1 SOLUTION RESPIRATORY (INHALATION)
Status: DISCONTINUED | OUTPATIENT
Start: 2023-01-01 | End: 2023-01-01

## 2023-01-01 RX ORDER — GLYCOPYRROLATE 0.2 MG/ML
0.2 INJECTION INTRAMUSCULAR; INTRAVENOUS EVERY 4 HOURS PRN
Status: DISCONTINUED | OUTPATIENT
Start: 2023-01-01 | End: 2023-01-01 | Stop reason: HOSPADM

## 2023-01-01 RX ORDER — POLYVINYL ALCOHOL 14 MG/ML
1 SOLUTION/ DROPS OPHTHALMIC EVERY 4 HOURS
Status: DISCONTINUED | OUTPATIENT
Start: 2023-01-01 | End: 2023-01-01

## 2023-01-01 RX ORDER — SODIUM CHLORIDE 0.9 % (FLUSH) 0.9 %
5-40 SYRINGE (ML) INJECTION EVERY 12 HOURS SCHEDULED
Status: DISCONTINUED | OUTPATIENT
Start: 2023-01-01 | End: 2023-01-01 | Stop reason: SDUPTHER

## 2023-01-01 RX ORDER — LORAZEPAM 2 MG/ML
0.5 INJECTION INTRAMUSCULAR
Status: DISCONTINUED | OUTPATIENT
Start: 2023-01-01 | End: 2023-01-01 | Stop reason: HOSPADM

## 2023-01-01 RX ORDER — HYDRALAZINE HYDROCHLORIDE 25 MG/1
25 TABLET, FILM COATED ORAL EVERY 8 HOURS SCHEDULED
Status: DISCONTINUED | OUTPATIENT
Start: 2023-01-01 | End: 2023-01-01

## 2023-01-01 RX ORDER — ONDANSETRON 2 MG/ML
4 INJECTION INTRAMUSCULAR; INTRAVENOUS
Status: DISCONTINUED | OUTPATIENT
Start: 2023-01-01 | End: 2023-01-01

## 2023-01-01 RX ORDER — ONDANSETRON 2 MG/ML
4 INJECTION INTRAMUSCULAR; INTRAVENOUS EVERY 6 HOURS PRN
Status: DISCONTINUED | OUTPATIENT
Start: 2023-01-01 | End: 2023-01-01

## 2023-01-01 RX ORDER — EPTIFIBATIDE 0.75 MG/ML
0.5 INJECTION, SOLUTION INTRAVENOUS CONTINUOUS
Status: DISCONTINUED | OUTPATIENT
Start: 2023-01-01 | End: 2023-01-01

## 2023-01-01 RX ORDER — LIDOCAINE HYDROCHLORIDE 10 MG/ML
5 INJECTION, SOLUTION EPIDURAL; INFILTRATION; INTRACAUDAL; PERINEURAL ONCE
Status: DISCONTINUED | OUTPATIENT
Start: 2023-01-01 | End: 2023-01-01

## 2023-01-01 RX ADMIN — MORPHINE SULFATE 3 MG: 4 INJECTION, SOLUTION INTRAMUSCULAR; INTRAVENOUS at 18:30

## 2023-01-01 RX ADMIN — HEPARIN SODIUM 5000 UNITS: 10000 INJECTION INTRAVENOUS; SUBCUTANEOUS at 05:20

## 2023-01-01 RX ADMIN — Medication 5000 UNITS: at 09:20

## 2023-01-01 RX ADMIN — SODIUM CHLORIDE, PRESERVATIVE FREE 10 ML: 5 INJECTION INTRAVENOUS at 22:06

## 2023-01-01 RX ADMIN — ASPIRIN 81 MG 81 MG: 81 TABLET ORAL at 07:47

## 2023-01-01 RX ADMIN — LABETALOL HYDROCHLORIDE 5 MG: 5 INJECTION INTRAVENOUS at 08:45

## 2023-01-01 RX ADMIN — ASPIRIN 81 MG 81 MG: 81 TABLET ORAL at 08:02

## 2023-01-01 RX ADMIN — SODIUM CHLORIDE, PRESERVATIVE FREE 10 ML: 5 INJECTION INTRAVENOUS at 08:33

## 2023-01-01 RX ADMIN — TICAGRELOR 90 MG: 90 TABLET ORAL at 08:02

## 2023-01-01 RX ADMIN — POLYVINYL ALCOHOL 1 DROP: 14 SOLUTION/ DROPS OPHTHALMIC at 11:15

## 2023-01-01 RX ADMIN — CHLORHEXIDINE GLUCONATE 0.12% ORAL RINSE 15 ML: 1.2 LIQUID ORAL at 07:47

## 2023-01-01 RX ADMIN — HYDRALAZINE HYDROCHLORIDE 10 MG: 20 INJECTION INTRAMUSCULAR; INTRAVENOUS at 14:40

## 2023-01-01 RX ADMIN — LABETALOL HYDROCHLORIDE 5 MG: 5 INJECTION INTRAVENOUS at 08:00

## 2023-01-01 RX ADMIN — LABETALOL HYDROCHLORIDE 5 MG: 5 INJECTION INTRAVENOUS at 00:23

## 2023-01-01 RX ADMIN — SODIUM CHLORIDE, PRESERVATIVE FREE 10 ML: 5 INJECTION INTRAVENOUS at 20:46

## 2023-01-01 RX ADMIN — POLYVINYL ALCOHOL 1 DROP: 14 SOLUTION/ DROPS OPHTHALMIC at 13:43

## 2023-01-01 RX ADMIN — ACETAMINOPHEN 650 MG: 650 SOLUTION ORAL at 20:08

## 2023-01-01 RX ADMIN — HYDRALAZINE HYDROCHLORIDE 10 MG: 20 INJECTION INTRAMUSCULAR; INTRAVENOUS at 14:34

## 2023-01-01 RX ADMIN — TICAGRELOR 90 MG: 90 TABLET ORAL at 07:47

## 2023-01-01 RX ADMIN — FAMOTIDINE 20 MG: 20 TABLET, FILM COATED ORAL at 07:47

## 2023-01-01 RX ADMIN — POLYETHYLENE GLYCOL 3350 17 G: 17 POWDER, FOR SOLUTION ORAL at 14:44

## 2023-01-01 RX ADMIN — LABETALOL HYDROCHLORIDE 5 MG: 5 INJECTION INTRAVENOUS at 03:05

## 2023-01-01 RX ADMIN — MIDAZOLAM HYDROCHLORIDE 1 MG: 1 INJECTION, SOLUTION INTRAMUSCULAR; INTRAVENOUS at 09:13

## 2023-01-01 RX ADMIN — TICAGRELOR 90 MG: 90 TABLET ORAL at 21:01

## 2023-01-01 RX ADMIN — ACETAMINOPHEN 650 MG: 650 SOLUTION ORAL at 15:12

## 2023-01-01 RX ADMIN — POLYVINYL ALCOHOL 1 DROP: 14 SOLUTION/ DROPS OPHTHALMIC at 23:14

## 2023-01-01 RX ADMIN — POLYVINYL ALCOHOL 1 DROP: 14 SOLUTION/ DROPS OPHTHALMIC at 23:17

## 2023-01-01 RX ADMIN — SODIUM CHLORIDE, PRESERVATIVE FREE 10 ML: 5 INJECTION INTRAVENOUS at 07:49

## 2023-01-01 RX ADMIN — FENTANYL CITRATE 25 MCG: 50 INJECTION, SOLUTION INTRAMUSCULAR; INTRAVENOUS at 00:17

## 2023-01-01 RX ADMIN — POLYVINYL ALCOHOL 1 DROP: 14 SOLUTION/ DROPS OPHTHALMIC at 23:10

## 2023-01-01 RX ADMIN — LABETALOL HYDROCHLORIDE 5 MG: 5 INJECTION INTRAVENOUS at 13:00

## 2023-01-01 RX ADMIN — LABETALOL HYDROCHLORIDE 5 MG: 5 INJECTION INTRAVENOUS at 06:38

## 2023-01-01 RX ADMIN — Medication 500 MG: at 20:07

## 2023-01-01 RX ADMIN — POLYVINYL ALCOHOL 1 DROP: 14 SOLUTION/ DROPS OPHTHALMIC at 03:05

## 2023-01-01 RX ADMIN — HYDRALAZINE HYDROCHLORIDE 10 MG: 20 INJECTION INTRAMUSCULAR; INTRAVENOUS at 07:22

## 2023-01-01 RX ADMIN — LABETALOL HYDROCHLORIDE 5 MG: 5 INJECTION INTRAVENOUS at 20:11

## 2023-01-01 RX ADMIN — GADOBENATE DIMEGLUMINE 18 ML: 529 INJECTION, SOLUTION INTRAVENOUS at 11:17

## 2023-01-01 RX ADMIN — ATORVASTATIN CALCIUM 20 MG: 20 TABLET, FILM COATED ORAL at 07:47

## 2023-01-01 RX ADMIN — FAMOTIDINE 20 MG: 10 INJECTION, SOLUTION INTRAVENOUS at 08:30

## 2023-01-01 RX ADMIN — HEPARIN SODIUM 5000 UNITS: 10000 INJECTION INTRAVENOUS; SUBCUTANEOUS at 13:42

## 2023-01-01 RX ADMIN — AMLODIPINE BESYLATE 10 MG: 10 TABLET ORAL at 08:02

## 2023-01-01 RX ADMIN — CHLORHEXIDINE GLUCONATE 0.12% ORAL RINSE 15 ML: 1.2 LIQUID ORAL at 20:06

## 2023-01-01 RX ADMIN — LABETALOL HYDROCHLORIDE 5 MG: 5 INJECTION INTRAVENOUS at 22:43

## 2023-01-01 RX ADMIN — HYDRALAZINE HYDROCHLORIDE 10 MG: 20 INJECTION INTRAMUSCULAR; INTRAVENOUS at 21:56

## 2023-01-01 RX ADMIN — POLYVINYL ALCOHOL 1 DROP: 14 SOLUTION/ DROPS OPHTHALMIC at 06:21

## 2023-01-01 RX ADMIN — FENTANYL CITRATE 25 MCG: 50 INJECTION, SOLUTION INTRAMUSCULAR; INTRAVENOUS at 23:39

## 2023-01-01 RX ADMIN — GLYCOPYRROLATE 0.2 MG: 0.2 INJECTION INTRAMUSCULAR; INTRAVENOUS at 16:27

## 2023-01-01 RX ADMIN — LORAZEPAM 0.5 MG: 2 INJECTION INTRAMUSCULAR; INTRAVENOUS at 10:58

## 2023-01-01 RX ADMIN — HYDRALAZINE HYDROCHLORIDE 10 MG: 20 INJECTION INTRAMUSCULAR; INTRAVENOUS at 17:00

## 2023-01-01 RX ADMIN — CHLORHEXIDINE GLUCONATE 0.12% ORAL RINSE 15 ML: 1.2 LIQUID ORAL at 08:00

## 2023-01-01 RX ADMIN — HYDRALAZINE HYDROCHLORIDE 10 MG: 20 INJECTION INTRAMUSCULAR; INTRAVENOUS at 05:05

## 2023-01-01 RX ADMIN — ASPIRIN 81 MG 81 MG: 81 TABLET ORAL at 08:32

## 2023-01-01 RX ADMIN — MORPHINE SULFATE 2 MG: 2 INJECTION, SOLUTION INTRAMUSCULAR; INTRAVENOUS at 17:50

## 2023-01-01 RX ADMIN — FENTANYL CITRATE 25 MCG: 50 INJECTION, SOLUTION INTRAMUSCULAR; INTRAVENOUS at 09:15

## 2023-01-01 RX ADMIN — HYDRALAZINE HYDROCHLORIDE 10 MG: 20 INJECTION INTRAMUSCULAR; INTRAVENOUS at 02:55

## 2023-01-01 RX ADMIN — LABETALOL HYDROCHLORIDE 5 MG: 5 INJECTION INTRAVENOUS at 16:30

## 2023-01-01 RX ADMIN — MORPHINE SULFATE 2 MG: 2 INJECTION, SOLUTION INTRAMUSCULAR; INTRAVENOUS at 14:00

## 2023-01-01 RX ADMIN — PROPOFOL 50 MCG/KG/MIN: 10 INJECTION, EMULSION INTRAVENOUS at 13:00

## 2023-01-01 RX ADMIN — MORPHINE SULFATE 3 MG: 4 INJECTION, SOLUTION INTRAMUSCULAR; INTRAVENOUS at 19:32

## 2023-01-01 RX ADMIN — HEPARIN SODIUM 5000 UNITS: 10000 INJECTION INTRAVENOUS; SUBCUTANEOUS at 13:58

## 2023-01-01 RX ADMIN — LABETALOL HYDROCHLORIDE 5 MG: 5 INJECTION INTRAVENOUS at 12:00

## 2023-01-01 RX ADMIN — TICAGRELOR 90 MG: 90 TABLET ORAL at 08:34

## 2023-01-01 RX ADMIN — ACETAMINOPHEN 650 MG: 650 SOLUTION ORAL at 14:30

## 2023-01-01 RX ADMIN — HEPARIN 300 UNITS: 100 SYRINGE at 20:10

## 2023-01-01 RX ADMIN — ACETAMINOPHEN 650 MG: 650 SOLUTION ORAL at 14:45

## 2023-01-01 RX ADMIN — LABETALOL HYDROCHLORIDE 5 MG: 5 INJECTION INTRAVENOUS at 17:00

## 2023-01-01 RX ADMIN — SODIUM CHLORIDE, PRESERVATIVE FREE 10 ML: 5 INJECTION INTRAVENOUS at 08:02

## 2023-01-01 RX ADMIN — HEPARIN SODIUM 5000 UNITS: 10000 INJECTION INTRAVENOUS; SUBCUTANEOUS at 06:44

## 2023-01-01 RX ADMIN — PROPOFOL 15 MCG/KG/MIN: 10 INJECTION, EMULSION INTRAVENOUS at 23:03

## 2023-01-01 RX ADMIN — SODIUM CHLORIDE: 9 INJECTION, SOLUTION INTRAVENOUS at 00:56

## 2023-01-01 RX ADMIN — POLYVINYL ALCOHOL 1 DROP: 14 SOLUTION/ DROPS OPHTHALMIC at 23:23

## 2023-01-01 RX ADMIN — LABETALOL HYDROCHLORIDE 5 MG: 5 INJECTION INTRAVENOUS at 10:00

## 2023-01-01 RX ADMIN — SODIUM CHLORIDE 25 ML/HR: 3 INJECTION, SOLUTION INTRAVENOUS at 14:00

## 2023-01-01 RX ADMIN — EPTIFIBATIDE 0.5 MCG/KG/MIN: 0.75 INJECTION INTRAVENOUS at 13:00

## 2023-01-01 RX ADMIN — MORPHINE SULFATE 2 MG: 2 INJECTION, SOLUTION INTRAMUSCULAR; INTRAVENOUS at 17:05

## 2023-01-01 RX ADMIN — POLYETHYLENE GLYCOL 3350 17 G: 17 POWDER, FOR SOLUTION ORAL at 08:02

## 2023-01-01 RX ADMIN — LACTULOSE 30 G: 10 SOLUTION ORAL at 09:02

## 2023-01-01 RX ADMIN — MORPHINE SULFATE 2 MG: 2 INJECTION, SOLUTION INTRAMUSCULAR; INTRAVENOUS at 10:58

## 2023-01-01 RX ADMIN — ACETAMINOPHEN 650 MG: 650 SOLUTION ORAL at 21:03

## 2023-01-01 RX ADMIN — AMLODIPINE BESYLATE 5 MG: 5 TABLET ORAL at 07:47

## 2023-01-01 RX ADMIN — ACETAMINOPHEN 650 MG: 650 SOLUTION ORAL at 09:02

## 2023-01-01 RX ADMIN — CHLORHEXIDINE GLUCONATE 0.12% ORAL RINSE 15 ML: 1.2 LIQUID ORAL at 20:00

## 2023-01-01 RX ADMIN — POLYETHYLENE GLYCOL 3350 17 G: 17 POWDER, FOR SOLUTION ORAL at 09:02

## 2023-01-01 RX ADMIN — HYDRALAZINE HYDROCHLORIDE 10 MG: 20 INJECTION INTRAMUSCULAR; INTRAVENOUS at 19:32

## 2023-01-01 RX ADMIN — HYDRALAZINE HYDROCHLORIDE 10 MG: 20 INJECTION INTRAMUSCULAR; INTRAVENOUS at 06:16

## 2023-01-01 RX ADMIN — HYDRALAZINE HYDROCHLORIDE 10 MG: 20 INJECTION INTRAMUSCULAR; INTRAVENOUS at 06:47

## 2023-01-01 RX ADMIN — LABETALOL HYDROCHLORIDE 5 MG: 5 INJECTION INTRAVENOUS at 20:54

## 2023-01-01 RX ADMIN — FAMOTIDINE 20 MG: 10 INJECTION, SOLUTION INTRAVENOUS at 22:03

## 2023-01-01 RX ADMIN — MORPHINE SULFATE 2 MG: 2 INJECTION, SOLUTION INTRAMUSCULAR; INTRAVENOUS at 17:35

## 2023-01-01 RX ADMIN — SENNOSIDES 5 ML: 8.8 SYRUP ORAL at 20:07

## 2023-01-01 RX ADMIN — LORAZEPAM 0.5 MG: 2 INJECTION INTRAMUSCULAR; INTRAVENOUS at 18:45

## 2023-01-01 RX ADMIN — POLYVINYL ALCOHOL 1 DROP: 14 SOLUTION/ DROPS OPHTHALMIC at 06:58

## 2023-01-01 RX ADMIN — POLYVINYL ALCOHOL 1 DROP: 14 SOLUTION/ DROPS OPHTHALMIC at 15:15

## 2023-01-01 RX ADMIN — HYDRALAZINE HYDROCHLORIDE 10 MG: 20 INJECTION INTRAMUSCULAR; INTRAVENOUS at 11:45

## 2023-01-01 RX ADMIN — MORPHINE SULFATE 2 MG: 2 INJECTION, SOLUTION INTRAMUSCULAR; INTRAVENOUS at 18:05

## 2023-01-01 RX ADMIN — MORPHINE SULFATE 2 MG: 2 INJECTION, SOLUTION INTRAMUSCULAR; INTRAVENOUS at 14:15

## 2023-01-01 RX ADMIN — TICAGRELOR 90 MG: 90 TABLET ORAL at 09:02

## 2023-01-01 RX ADMIN — Medication 1000 ML: at 09:20

## 2023-01-01 RX ADMIN — MORPHINE SULFATE 2 MG: 2 INJECTION, SOLUTION INTRAMUSCULAR; INTRAVENOUS at 12:15

## 2023-01-01 RX ADMIN — MORPHINE SULFATE 2 MG: 2 INJECTION, SOLUTION INTRAMUSCULAR; INTRAVENOUS at 18:20

## 2023-01-01 RX ADMIN — HEPARIN SODIUM 5000 UNITS: 10000 INJECTION INTRAVENOUS; SUBCUTANEOUS at 05:55

## 2023-01-01 RX ADMIN — POLYVINYL ALCOHOL 1 DROP: 14 SOLUTION/ DROPS OPHTHALMIC at 23:39

## 2023-01-01 RX ADMIN — LABETALOL HYDROCHLORIDE 5 MG: 5 INJECTION INTRAVENOUS at 10:26

## 2023-01-01 RX ADMIN — SODIUM CHLORIDE: 9 INJECTION, SOLUTION INTRAVENOUS at 11:37

## 2023-01-01 RX ADMIN — FENTANYL CITRATE 25 MCG: 50 INJECTION, SOLUTION INTRAMUSCULAR; INTRAVENOUS at 21:58

## 2023-01-01 RX ADMIN — HYDRALAZINE HYDROCHLORIDE 10 MG: 20 INJECTION INTRAMUSCULAR; INTRAVENOUS at 21:24

## 2023-01-01 RX ADMIN — CHLORHEXIDINE GLUCONATE 0.12% ORAL RINSE 15 ML: 1.2 LIQUID ORAL at 22:03

## 2023-01-01 RX ADMIN — TICAGRELOR 90 MG: 90 TABLET ORAL at 20:45

## 2023-01-01 RX ADMIN — CHLORHEXIDINE GLUCONATE 0.12% ORAL RINSE 15 ML: 1.2 LIQUID ORAL at 20:45

## 2023-01-01 RX ADMIN — ATORVASTATIN CALCIUM 20 MG: 20 TABLET, FILM COATED ORAL at 09:02

## 2023-01-01 RX ADMIN — IOPAMIDOL 130 ML: 612 INJECTION, SOLUTION INTRAVENOUS at 11:00

## 2023-01-01 RX ADMIN — HEPARIN SODIUM 5000 UNITS: 10000 INJECTION INTRAVENOUS; SUBCUTANEOUS at 20:10

## 2023-01-01 RX ADMIN — FENTANYL CITRATE 25 MCG: 50 INJECTION, SOLUTION INTRAMUSCULAR; INTRAVENOUS at 02:00

## 2023-01-01 RX ADMIN — POLYVINYL ALCOHOL 1 DROP: 14 SOLUTION/ DROPS OPHTHALMIC at 05:30

## 2023-01-01 RX ADMIN — HYDRALAZINE HYDROCHLORIDE 10 MG: 20 INJECTION INTRAMUSCULAR; INTRAVENOUS at 09:15

## 2023-01-01 RX ADMIN — AMLODIPINE BESYLATE 5 MG: 5 TABLET ORAL at 14:45

## 2023-01-01 RX ADMIN — ATORVASTATIN CALCIUM 20 MG: 20 TABLET, FILM COATED ORAL at 08:01

## 2023-01-01 RX ADMIN — MORPHINE SULFATE 2 MG: 2 INJECTION, SOLUTION INTRAMUSCULAR; INTRAVENOUS at 16:50

## 2023-01-01 RX ADMIN — MORPHINE SULFATE 2 MG: 2 INJECTION, SOLUTION INTRAMUSCULAR; INTRAVENOUS at 14:30

## 2023-01-01 RX ADMIN — MORPHINE SULFATE 2 MG: 2 INJECTION, SOLUTION INTRAMUSCULAR; INTRAVENOUS at 15:34

## 2023-01-01 RX ADMIN — ACETAMINOPHEN 650 MG: 650 SOLUTION ORAL at 07:48

## 2023-01-01 RX ADMIN — HYDRALAZINE HYDROCHLORIDE 10 MG: 20 INJECTION INTRAMUSCULAR; INTRAVENOUS at 18:44

## 2023-01-01 RX ADMIN — MORPHINE SULFATE 2 MG: 2 INJECTION, SOLUTION INTRAMUSCULAR; INTRAVENOUS at 16:35

## 2023-01-01 RX ADMIN — AMLODIPINE BESYLATE 10 MG: 10 TABLET ORAL at 09:02

## 2023-01-01 RX ADMIN — POLYVINYL ALCOHOL 1 DROP: 14 SOLUTION/ DROPS OPHTHALMIC at 03:10

## 2023-01-01 RX ADMIN — SODIUM CHLORIDE, PRESERVATIVE FREE 10 ML: 5 INJECTION INTRAVENOUS at 20:01

## 2023-01-01 RX ADMIN — SODIUM CHLORIDE: 9 INJECTION, SOLUTION INTRAVENOUS at 22:17

## 2023-01-01 RX ADMIN — CHLORHEXIDINE GLUCONATE 0.12% ORAL RINSE 15 ML: 1.2 LIQUID ORAL at 09:02

## 2023-01-01 RX ADMIN — SODIUM CHLORIDE 25 ML/HR: 3 INJECTION, SOLUTION INTRAVENOUS at 12:20

## 2023-01-01 RX ADMIN — CHLORHEXIDINE GLUCONATE 0.12% ORAL RINSE 15 ML: 1.2 LIQUID ORAL at 08:30

## 2023-01-01 RX ADMIN — ASPIRIN 81 MG 81 MG: 81 TABLET ORAL at 09:02

## 2023-01-01 RX ADMIN — HEPARIN SODIUM 5000 UNITS: 10000 INJECTION INTRAVENOUS; SUBCUTANEOUS at 20:00

## 2023-01-01 RX ADMIN — MORPHINE SULFATE 2 MG: 2 INJECTION, SOLUTION INTRAMUSCULAR; INTRAVENOUS at 16:20

## 2023-01-01 RX ADMIN — ACETAMINOPHEN 650 MG: 650 SOLUTION ORAL at 00:23

## 2023-01-01 RX ADMIN — LORAZEPAM 0.5 MG: 2 INJECTION INTRAMUSCULAR; INTRAVENOUS at 19:15

## 2023-01-01 RX ADMIN — TICAGRELOR 90 MG: 90 TABLET ORAL at 20:06

## 2023-01-01 RX ADMIN — IOPAMIDOL 100 ML: 755 INJECTION, SOLUTION INTRAVENOUS at 08:24

## 2023-01-01 RX ADMIN — HEPARIN SODIUM 5000 UNITS: 10000 INJECTION INTRAVENOUS; SUBCUTANEOUS at 22:04

## 2023-01-01 RX ADMIN — MORPHINE SULFATE 3 MG: 4 INJECTION, SOLUTION INTRAMUSCULAR; INTRAVENOUS at 18:45

## 2023-01-01 RX ADMIN — SODIUM CHLORIDE, PRESERVATIVE FREE 10 ML: 5 INJECTION INTRAVENOUS at 20:57

## 2023-01-01 RX ADMIN — POLYVINYL ALCOHOL 1 DROP: 14 SOLUTION/ DROPS OPHTHALMIC at 11:42

## 2023-01-01 RX ADMIN — FAMOTIDINE 20 MG: 10 INJECTION, SOLUTION INTRAVENOUS at 08:00

## 2023-01-01 RX ADMIN — FENTANYL CITRATE 25 MCG: 50 INJECTION, SOLUTION INTRAMUSCULAR; INTRAVENOUS at 18:10

## 2023-01-01 RX ADMIN — CHLORHEXIDINE GLUCONATE 0.12% ORAL RINSE 15 ML: 1.2 LIQUID ORAL at 21:01

## 2023-01-01 RX ADMIN — LIDOCAINE HYDROCHLORIDE 5 ML: 20 INJECTION, SOLUTION INFILTRATION; PERINEURAL at 09:17

## 2023-01-01 RX ADMIN — LABETALOL HYDROCHLORIDE 5 MG: 5 INJECTION INTRAVENOUS at 16:20

## 2023-01-01 RX ADMIN — LORAZEPAM 0.5 MG: 2 INJECTION INTRAMUSCULAR; INTRAVENOUS at 19:31

## 2023-01-01 RX ADMIN — HYDRALAZINE HYDROCHLORIDE 10 MG: 20 INJECTION INTRAMUSCULAR; INTRAVENOUS at 13:40

## 2023-01-01 RX ADMIN — MORPHINE SULFATE 2 MG: 2 INJECTION, SOLUTION INTRAMUSCULAR; INTRAVENOUS at 15:59

## 2023-01-01 RX ADMIN — ACETAMINOPHEN 650 MG: 650 SOLUTION ORAL at 20:44

## 2023-01-01 RX ADMIN — LORAZEPAM 0.5 MG: 2 INJECTION INTRAMUSCULAR; INTRAVENOUS at 17:04

## 2023-01-01 RX ADMIN — HYDRALAZINE HYDROCHLORIDE 10 MG: 20 INJECTION INTRAMUSCULAR; INTRAVENOUS at 17:18

## 2023-01-01 RX ADMIN — POLYVINYL ALCOHOL 1 DROP: 14 SOLUTION/ DROPS OPHTHALMIC at 06:07

## 2023-01-01 RX ADMIN — MORPHINE SULFATE 3 MG: 4 INJECTION, SOLUTION INTRAMUSCULAR; INTRAVENOUS at 19:15

## 2023-01-01 RX ADMIN — Medication 1000 ML: at 10:15

## 2023-01-01 RX ADMIN — MORPHINE SULFATE 3 MG: 4 INJECTION, SOLUTION INTRAMUSCULAR; INTRAVENOUS at 19:00

## 2023-01-01 RX ADMIN — MORPHINE SULFATE 2 MG: 2 INJECTION, SOLUTION INTRAMUSCULAR; INTRAVENOUS at 11:13

## 2023-01-01 RX ADMIN — TICAGRELOR 90 MG: 90 TABLET ORAL at 20:00

## 2023-01-01 RX ADMIN — SENNOSIDES 5 ML: 8.8 SYRUP ORAL at 21:01

## 2023-01-01 RX ADMIN — POTASSIUM PHOSPHATE, MONOBASIC AND POTASSIUM PHOSPHATE, DIBASIC 30 MMOL: 224; 236 INJECTION, SOLUTION, CONCENTRATE INTRAVENOUS at 10:08

## 2023-01-01 RX ADMIN — HYDRALAZINE HYDROCHLORIDE 25 MG: 25 TABLET, FILM COATED ORAL at 09:03

## 2023-01-01 RX ADMIN — HEPARIN SODIUM: 1000 INJECTION, SOLUTION INTRAVENOUS; SUBCUTANEOUS at 09:17

## 2023-01-01 RX ADMIN — PROPOFOL 19.96 MCG/KG/MIN: 10 INJECTION, EMULSION INTRAVENOUS at 11:33

## 2023-01-01 RX ADMIN — FAMOTIDINE 20 MG: 20 TABLET, FILM COATED ORAL at 08:01

## 2023-01-01 RX ADMIN — LABETALOL HYDROCHLORIDE 5 MG: 5 INJECTION INTRAVENOUS at 17:49

## 2023-01-01 RX ADMIN — HYDRALAZINE HYDROCHLORIDE 10 MG: 20 INJECTION INTRAMUSCULAR; INTRAVENOUS at 03:27

## 2023-01-01 RX ADMIN — SODIUM CHLORIDE 25 ML/HR: 3 INJECTION, SOLUTION INTRAVENOUS at 06:48

## 2023-01-01 RX ADMIN — AMLODIPINE BESYLATE 5 MG: 5 TABLET ORAL at 08:00

## 2023-01-01 RX ADMIN — HYDRALAZINE HYDROCHLORIDE 10 MG: 20 INJECTION INTRAMUSCULAR; INTRAVENOUS at 01:52

## 2023-01-01 RX ADMIN — HYDRALAZINE HYDROCHLORIDE 10 MG: 20 INJECTION INTRAMUSCULAR; INTRAVENOUS at 11:00

## 2023-01-01 RX ADMIN — PROPOFOL 20 MCG/KG/MIN: 10 INJECTION, EMULSION INTRAVENOUS at 18:56

## 2023-01-01 RX ADMIN — HYDRALAZINE HYDROCHLORIDE 10 MG: 20 INJECTION INTRAMUSCULAR; INTRAVENOUS at 09:53

## 2023-01-01 RX ADMIN — Medication 2 PACKET: at 07:47

## 2023-01-01 RX ADMIN — POLYVINYL ALCOHOL 1 DROP: 14 SOLUTION/ DROPS OPHTHALMIC at 19:08

## 2023-01-01 RX ADMIN — CHLORHEXIDINE GLUCONATE 0.12% ORAL RINSE 15 ML: 1.2 LIQUID ORAL at 08:02

## 2023-01-01 RX ADMIN — LORAZEPAM 0.5 MG: 2 INJECTION INTRAMUSCULAR; INTRAVENOUS at 19:00

## 2023-01-01 RX ADMIN — MORPHINE SULFATE 2 MG: 2 INJECTION, SOLUTION INTRAMUSCULAR; INTRAVENOUS at 17:20

## 2023-01-01 RX ADMIN — SODIUM CHLORIDE 500 ML: 9 INJECTION, SOLUTION INTRAVENOUS at 08:16

## 2023-01-01 RX ADMIN — MORPHINE SULFATE 2 MG: 2 INJECTION, SOLUTION INTRAMUSCULAR; INTRAVENOUS at 13:45

## 2023-01-01 RX ADMIN — LABETALOL HYDROCHLORIDE 5 MG: 5 INJECTION INTRAVENOUS at 08:20

## 2023-01-01 RX ADMIN — TICAGRELOR 90 MG: 90 TABLET ORAL at 08:00

## 2023-01-01 RX ADMIN — POLYVINYL ALCOHOL 1 DROP: 14 SOLUTION/ DROPS OPHTHALMIC at 14:51

## 2023-01-01 RX ADMIN — PROPOFOL 20 MCG/KG/MIN: 10 INJECTION, EMULSION INTRAVENOUS at 02:59

## 2023-01-01 RX ADMIN — SODIUM CHLORIDE 500 ML: 9 INJECTION, SOLUTION INTRAVENOUS at 08:49

## 2023-01-01 RX ADMIN — LABETALOL HYDROCHLORIDE 5 MG: 5 INJECTION INTRAVENOUS at 16:48

## 2023-01-01 RX ADMIN — HEPARIN 300 UNITS: 100 SYRINGE at 20:00

## 2023-01-01 RX ADMIN — ASPIRIN 81 MG 81 MG: 81 TABLET ORAL at 08:00

## 2023-01-01 RX ADMIN — MAGNESIUM SULFATE HEPTAHYDRATE 2000 MG: 40 INJECTION, SOLUTION INTRAVENOUS at 14:34

## 2023-01-01 RX ADMIN — FAMOTIDINE 20 MG: 10 INJECTION, SOLUTION INTRAVENOUS at 20:45

## 2023-01-01 RX ADMIN — POLYVINYL ALCOHOL 1 DROP: 14 SOLUTION/ DROPS OPHTHALMIC at 06:47

## 2023-01-01 RX ADMIN — POLYVINYL ALCOHOL 1 DROP: 14 SOLUTION/ DROPS OPHTHALMIC at 06:38

## 2023-01-01 RX ADMIN — ATORVASTATIN CALCIUM 20 MG: 20 TABLET, FILM COATED ORAL at 14:44

## 2023-01-01 RX ADMIN — POLYVINYL ALCOHOL 1 DROP: 14 SOLUTION/ DROPS OPHTHALMIC at 02:58

## 2023-01-01 RX ADMIN — HEPARIN 300 UNITS: 100 SYRINGE at 07:47

## 2023-01-01 RX ADMIN — HEPARIN 300 UNITS: 100 SYRINGE at 21:01

## 2023-01-01 RX ADMIN — POLYVINYL ALCOHOL 1 DROP: 14 SOLUTION/ DROPS OPHTHALMIC at 18:42

## 2023-01-01 RX ADMIN — LORAZEPAM 0.5 MG: 2 INJECTION INTRAMUSCULAR; INTRAVENOUS at 17:50

## 2023-01-01 RX ADMIN — BISACODYL 10 MG: 10 SUPPOSITORY RECTAL at 06:41

## 2023-01-01 RX ADMIN — LABETALOL HYDROCHLORIDE 5 MG: 5 INJECTION INTRAVENOUS at 00:17

## 2023-01-01 RX ADMIN — SODIUM CHLORIDE, PRESERVATIVE FREE 10 ML: 5 INJECTION INTRAVENOUS at 20:13

## 2023-01-01 RX ADMIN — EPTIFIBATIDE 0.5 MCG/KG/MIN: 0.75 INJECTION INTRAVENOUS at 09:58

## 2023-01-01 RX ADMIN — HYDRALAZINE HYDROCHLORIDE 10 MG: 20 INJECTION INTRAMUSCULAR; INTRAVENOUS at 00:04

## 2023-01-01 RX ADMIN — MORPHINE SULFATE 2 MG: 2 INJECTION, SOLUTION INTRAMUSCULAR; INTRAVENOUS at 12:30

## 2023-01-01 RX ADMIN — HYDRALAZINE HYDROCHLORIDE 10 MG: 20 INJECTION INTRAMUSCULAR; INTRAVENOUS at 03:45

## 2023-01-01 RX ADMIN — POLYVINYL ALCOHOL 1 DROP: 14 SOLUTION/ DROPS OPHTHALMIC at 20:00

## 2023-01-01 RX ADMIN — SODIUM CHLORIDE: 9 INJECTION, SOLUTION INTRAVENOUS at 14:53

## 2023-01-01 RX ADMIN — MORPHINE SULFATE 2 MG: 2 INJECTION, SOLUTION INTRAMUSCULAR; INTRAVENOUS at 15:06

## 2023-01-01 RX ADMIN — ENOXAPARIN SODIUM 40 MG: 100 INJECTION SUBCUTANEOUS at 08:00

## 2023-01-01 RX ADMIN — FAMOTIDINE 20 MG: 20 TABLET, FILM COATED ORAL at 09:02

## 2023-01-01 RX ADMIN — ACETAMINOPHEN 650 MG: 650 SOLUTION ORAL at 08:00

## 2023-01-01 RX ADMIN — POLYETHYLENE GLYCOL 3350 17 G: 17 POWDER, FOR SOLUTION ORAL at 07:49

## 2023-01-01 RX ADMIN — LIDOCAINE HYDROCHLORIDE 5 ML: 20 INJECTION, SOLUTION INFILTRATION; PERINEURAL at 10:17

## 2023-01-01 RX ADMIN — GLYCOPYRROLATE 0.2 MG: 0.2 INJECTION INTRAMUSCULAR; INTRAVENOUS at 10:58

## 2023-01-01 RX ADMIN — MORPHINE SULFATE 2 MG: 2 INJECTION, SOLUTION INTRAMUSCULAR; INTRAVENOUS at 14:46

## 2023-01-01 ASSESSMENT — PULMONARY FUNCTION TESTS
PIF_VALUE: 42
PIF_VALUE: 23
PIF_VALUE: 27
PIF_VALUE: 27
PIF_VALUE: 19
PIF_VALUE: 42
PIF_VALUE: 16
PIF_VALUE: 43
PIF_VALUE: 23
PIF_VALUE: 45
PIF_VALUE: 21
PIF_VALUE: 37
PIF_VALUE: 41
PIF_VALUE: 16
PIF_VALUE: 32
PIF_VALUE: 29
PIF_VALUE: 36
PIF_VALUE: 50
PIF_VALUE: 29
PIF_VALUE: 48
PIF_VALUE: 16
PIF_VALUE: 14
PIF_VALUE: 23
PIF_VALUE: 44
PIF_VALUE: 30
PIF_VALUE: 16
PIF_VALUE: 38
PIF_VALUE: 20
PIF_VALUE: 39
PIF_VALUE: 32
PIF_VALUE: 49
PIF_VALUE: 44
PIF_VALUE: 50
PIF_VALUE: 35
PIF_VALUE: 19
PIF_VALUE: 16
PIF_VALUE: 26
PIF_VALUE: 22
PIF_VALUE: 21
PIF_VALUE: 20
PIF_VALUE: 35
PIF_VALUE: 26
PIF_VALUE: 45
PIF_VALUE: 43
PIF_VALUE: 38
PIF_VALUE: 36
PIF_VALUE: 16
PIF_VALUE: 16
PIF_VALUE: 43
PIF_VALUE: 31
PIF_VALUE: 32
PIF_VALUE: 16
PIF_VALUE: 25
PIF_VALUE: 16
PIF_VALUE: 22
PIF_VALUE: 22
PIF_VALUE: 27
PIF_VALUE: 32
PIF_VALUE: 40
PIF_VALUE: 30
PIF_VALUE: 36
PIF_VALUE: 47
PIF_VALUE: 16
PIF_VALUE: 17
PIF_VALUE: 41
PIF_VALUE: 16
PIF_VALUE: 21
PIF_VALUE: 23
PIF_VALUE: 22
PIF_VALUE: 25
PIF_VALUE: 14
PIF_VALUE: 16
PIF_VALUE: 20
PIF_VALUE: 44
PIF_VALUE: 42
PIF_VALUE: 26
PIF_VALUE: 16
PIF_VALUE: 50
PIF_VALUE: 43
PIF_VALUE: 24
PIF_VALUE: 16
PIF_VALUE: 27
PIF_VALUE: 22
PIF_VALUE: 38
PIF_VALUE: 16
PIF_VALUE: 39
PIF_VALUE: 28
PIF_VALUE: 48
PIF_VALUE: 52
PIF_VALUE: 22
PIF_VALUE: 24
PIF_VALUE: 30
PIF_VALUE: 42
PIF_VALUE: 26
PIF_VALUE: 41
PIF_VALUE: 16
PIF_VALUE: 28
PIF_VALUE: 39
PIF_VALUE: 23
PIF_VALUE: 14
PIF_VALUE: 16
PIF_VALUE: 43
PIF_VALUE: 27
PIF_VALUE: 16
PIF_VALUE: 23
PIF_VALUE: 14
PIF_VALUE: 29
PIF_VALUE: 30
PIF_VALUE: 35
PIF_VALUE: 45
PIF_VALUE: 45
PIF_VALUE: 16
PIF_VALUE: 21
PIF_VALUE: 22
PIF_VALUE: 49
PIF_VALUE: 31
PIF_VALUE: 16
PIF_VALUE: 23
PIF_VALUE: 49
PIF_VALUE: 27
PIF_VALUE: 47
PIF_VALUE: 28
PIF_VALUE: 39
PIF_VALUE: 14
PIF_VALUE: 38
PIF_VALUE: 24
PIF_VALUE: 44
PIF_VALUE: 42
PIF_VALUE: 48
PIF_VALUE: 14
PIF_VALUE: 45
PIF_VALUE: 41
PIF_VALUE: 22
PIF_VALUE: 40
PIF_VALUE: 28
PIF_VALUE: 36
PIF_VALUE: 38
PIF_VALUE: 16
PIF_VALUE: 16
PIF_VALUE: 32
PIF_VALUE: 23
PIF_VALUE: 34
PIF_VALUE: 43
PIF_VALUE: 23
PIF_VALUE: 23
PIF_VALUE: 16
PIF_VALUE: 39
PIF_VALUE: 13
PIF_VALUE: 21
PIF_VALUE: 49
PIF_VALUE: 33
PIF_VALUE: 38
PIF_VALUE: 44
PIF_VALUE: 16
PIF_VALUE: 22
PIF_VALUE: 16
PIF_VALUE: 16
PIF_VALUE: 36
PIF_VALUE: 23
PIF_VALUE: 29
PIF_VALUE: 22
PIF_VALUE: 14
PIF_VALUE: 13
PIF_VALUE: 36
PIF_VALUE: 16
PIF_VALUE: 30
PIF_VALUE: 16
PIF_VALUE: 21
PIF_VALUE: 16
PIF_VALUE: 37
PIF_VALUE: 16
PIF_VALUE: 16
PIF_VALUE: 35
PIF_VALUE: 38
PIF_VALUE: 23

## 2023-01-01 ASSESSMENT — PAIN SCALES - GENERAL
PAINLEVEL_OUTOF10: 0
PAINLEVEL_OUTOF10: 1
PAINLEVEL_OUTOF10: 2
PAINLEVEL_OUTOF10: 0
PAINLEVEL_OUTOF10: 0
PAINLEVEL_OUTOF10: 3
PAINLEVEL_OUTOF10: 0
PAINLEVEL_OUTOF10: 3
PAINLEVEL_OUTOF10: 0
PAINLEVEL_OUTOF10: 3
PAINLEVEL_OUTOF10: 0
PAINLEVEL_OUTOF10: 7
PAINLEVEL_OUTOF10: 0

## 2023-01-01 ASSESSMENT — PAIN - FUNCTIONAL ASSESSMENT: PAIN_FUNCTIONAL_ASSESSMENT: NONE - DENIES PAIN

## 2023-03-25 ENCOUNTER — ANESTHESIA (OUTPATIENT)
Dept: INTERVENTIONAL RADIOLOGY/VASCULAR | Age: 88
End: 2023-03-25
Payer: MEDICARE

## 2023-03-25 ENCOUNTER — ANESTHESIA EVENT (OUTPATIENT)
Dept: INTERVENTIONAL RADIOLOGY/VASCULAR | Age: 88
End: 2023-03-25
Payer: MEDICARE

## 2023-03-25 PROBLEM — I65.1 BASILAR ARTERY OCCLUSION: Status: ACTIVE | Noted: 2023-03-25

## 2023-03-25 PROBLEM — I63.9 ACUTE CVA (CEREBROVASCULAR ACCIDENT) (HCC): Status: ACTIVE | Noted: 2023-03-25

## 2023-03-25 PROBLEM — J96.02 ACUTE RESPIRATORY FAILURE WITH HYPOXIA AND HYPERCAPNIA (HCC): Status: ACTIVE | Noted: 2023-01-01

## 2023-03-25 PROBLEM — J96.01 ACUTE RESPIRATORY FAILURE WITH HYPOXIA AND HYPERCAPNIA (HCC): Status: ACTIVE | Noted: 2023-03-25

## 2023-03-25 PROCEDURE — 2500000003 HC RX 250 WO HCPCS

## 2023-03-25 PROCEDURE — 6360000002 HC RX W HCPCS

## 2023-03-25 PROCEDURE — 2580000003 HC RX 258

## 2023-03-25 RX ORDER — ROCURONIUM BROMIDE 10 MG/ML
INJECTION, SOLUTION INTRAVENOUS PRN
Status: DISCONTINUED | OUTPATIENT
Start: 2023-03-25 | End: 2023-03-25 | Stop reason: SDUPTHER

## 2023-03-25 RX ORDER — SODIUM CHLORIDE 9 MG/ML
INJECTION, SOLUTION INTRAVENOUS CONTINUOUS PRN
Status: DISCONTINUED | OUTPATIENT
Start: 2023-03-25 | End: 2023-03-25 | Stop reason: SDUPTHER

## 2023-03-25 RX ORDER — GLYCOPYRROLATE 1 MG/5 ML
SYRINGE (ML) INTRAVENOUS PRN
Status: DISCONTINUED | OUTPATIENT
Start: 2023-03-25 | End: 2023-03-25 | Stop reason: SDUPTHER

## 2023-03-25 RX ORDER — PROPOFOL 10 MG/ML
INJECTION, EMULSION INTRAVENOUS CONTINUOUS PRN
Status: DISCONTINUED | OUTPATIENT
Start: 2023-03-25 | End: 2023-03-25 | Stop reason: SDUPTHER

## 2023-03-25 RX ORDER — EPTIFIBATIDE 0.75 MG/ML
INJECTION, SOLUTION INTRAVENOUS PRN
Status: DISCONTINUED | OUTPATIENT
Start: 2023-03-25 | End: 2023-03-25 | Stop reason: SDUPTHER

## 2023-03-25 RX ADMIN — SODIUM CHLORIDE: 9 INJECTION, SOLUTION INTRAVENOUS at 09:31

## 2023-03-25 RX ADMIN — Medication 0.4 MG: at 11:10

## 2023-03-25 RX ADMIN — EPTIFIBATIDE 11947.5 MCG: 0.75 INJECTION INTRAVENOUS at 10:58

## 2023-03-25 RX ADMIN — EPTIFIBATIDE 0.5 MCG/KG/MIN: 0.75 INJECTION INTRAVENOUS at 11:00

## 2023-03-25 RX ADMIN — PROPOFOL 50 MCG/KG/MIN: 10 INJECTION, EMULSION INTRAVENOUS at 09:34

## 2023-03-25 RX ADMIN — ROCURONIUM BROMIDE 50 MG: 10 INJECTION, SOLUTION INTRAVENOUS at 10:46

## 2023-03-25 NOTE — VIRTUAL HEALTH
regarding recommendations      Marcie Zuniga MD, MD   Stroke, Neurocritical Care And/or 1500 [facility] Stroke [address]  Electronically signed 3/25/2023 at 1:57 PM    Yamileth Wren, was evaluated through a synchronous (real-time) audio-video encounter. The patient (and/or guardian if applicable) is aware that this is a billable service, which includes applicable co-pays. This virtual visit was conducted with patient's (and/or legal guardian's) consent. Patient identification was verified, and a caregiver was present when appropriate. The patient was located at [address]): [address]  [address]  [address]: [phone]  The provider was located at [facility] Dept): 9 e [facility]  43 [facility], [address] [place] [address],[place] [address]  [phone]     Total time spent on this encounter:  716 Itzel Zhang MD on 3/25/2023 at 1:57 PM    An electronic signature was used to authenticate this note.

## 2023-03-25 NOTE — OR NURSING
After 3rd pass w/ suction-TICI 3; Dr. Radha Alexandre ordered CRNA to start Integrilin gtt and will be stenting.

## 2023-03-25 NOTE — OR NURSING
[address] 3.5mm x 12mm Deployed in Basilar Artery by Dr. Lalito Mello, inflated 12atm. Alert-The patient is alert, awake and responds to voice. The patient is oriented to time, place, and person. The triage nurse is able to obtain subjective information. No abnormalities

## 2023-03-25 NOTE — ANESTHESIA PRE PROCEDURE
Department of Anesthesiology  Preprocedure Note       Name:  Ramya Chavez   Age:  80 y.o.  :  1934                                          MRN:  [id number]         Date:  3/25/2023      Surgeon: * No surgeons listed *    Procedure: * No procedures listed *    Medications prior to admission:   Prior to Admission medications    Medication Sig Start Date End Date Taking? Authorizing Provider   senna-docusate (PERICOLACE) 8.6-50 MG per tablet Take 2 tablets by mouth daily as needed for Constipation 16   Maria Elena Bauman,    simvastatin (ZOCOR) 20 MG tablet Take 20 mg by mouth nightly. Historical Provider, MD   Azilsartan Medoxomil (EDARBI) 80 MG TABS Take  by mouth daily. Historical Provider, MD   aspirin 81 MG EC tablet Take 81 mg by mouth daily. Historical Provider, MD       Current medications:    Current Facility-Administered Medications   Medication Dose Route Frequency Provider Last Rate Last Admin    0.9 % sodium chloride bolus  500 mL IntraVENous Once Mariluz Chavez  mL/hr at 23 0849 500 mL at 23 0849     Current Outpatient Medications   Medication Sig Dispense Refill    senna-docusate (PERICOLACE) 8.6-50 MG per tablet Take 2 tablets by mouth daily as needed for Constipation 60 tablet 0    simvastatin (ZOCOR) 20 MG tablet Take 20 mg by mouth nightly.  Azilsartan Medoxomil (EDARBI) 80 MG TABS Take  by mouth daily.  aspirin 81 MG EC tablet Take 81 mg by mouth daily.          Allergies:  No Known Allergies    Problem List:    Patient Active Problem List   Diagnosis Code    HTN (hypertension) I10    Hyperlipidemia E78.5    Chest pain R07.9    Small bowel obstruction ([address] K56.609    Acute CVA (cerebrovascular accident) ([facility] [address] I63.9       Past Medical History:        Diagnosis Date    Chest pain 2016    Hyperlipidemia     Hypertension        Past Surgical History:        Procedure Laterality Date    CHOLECYSTECTOMY      HERNIA REPAIR

## 2023-03-25 NOTE — ANESTHESIA POSTPROCEDURE EVALUATION
Department of Anesthesiology  Postprocedure Note    Patient: Yessenia Patel  MRN: [id number]  [place]: 7/13/1934  Date of evaluation: 3/25/2023      Procedure Summary     Date: 03/25/23 Room / Location: [address] [place] [address] Procedures; [facility]    Anesthesia Start: 8610 Anesthesia Stop: 5569    Procedures:       IR MECHANICAL ART THROMBECTOMY INTRACRANIAL      IR VERTEBRAL INTRACRANIAL Diagnosis:       Embolic stroke involving basilar artery (HCC)      (Embolic stroke involving basilar artery ([address]))      (Embolic stroke involving basilar artery)      (Embolic stroke involving basilar artery)    Scheduled Providers: [facility] Radiologist Responsible Provider: Melissa Jean-Baptiste MD    Anesthesia Type: MAC ASA Status: 3          Anesthesia Type: No value filed. Temo Phase I:      Temo Phase II:        Anesthesia Post Evaluation    Patient location during evaluation: PACU  Patient participation: complete - patient cannot participate  Level of consciousness: sedated and ventilated  Airway patency: patent  Nausea & Vomiting: no nausea and no vomiting  Complications: no  Cardiovascular status: hemodynamically stable  Respiratory status: ventilator  Hydration status: euvolemic  Comments: Patient remains intubated in PACU after procedure due to intraprocedure neurologic status. Patient sedated with propofol, continued from interventional radiology area.

## 2023-03-25 NOTE — ED PROVIDER NOTES
encounter of 03/25/23   CMP   Result Value Ref Range    Sodium 139 132 - 146 mmol/L    Potassium 3.7 3.5 - 5.0 mmol/L    Chloride 104 98 - 107 mmol/L    CO2 26 22 - 29 mmol/L    Anion Gap 9 7 - 16 mmol/L    Glucose 133 (H) 74 - 99 mg/dL    BUN 18 6 - 23 mg/dL    Creatinine 1.0 0.7 - 1.2 mg/dL    Est, Glom Filt Rate >60 >=60 mL/min/1.73    Calcium 9.3 8.6 - 10.2 mg/dL    Total Protein 6.2 (L) 6.4 - 8.3 g/dL    Albumin 4.1 3.5 - 5.2 g/dL    Total Bilirubin 0.8 0.0 - 1.2 mg/dL    Alkaline Phosphatase 91 40 - 129 U/L    ALT 16 0 - 40 U/L    AST 19 0 - 39 U/L   Magnesium   Result Value Ref Range    Magnesium 2.0 1.6 - 2.6 mg/dL   CBC with Auto Differential   Result Value Ref Range    WBC 7.2 4.5 - 11.5 E9/L    RBC 4.66 3.80 - 5.80 E12/L    Hemoglobin 15.0 12.5 - 16.5 g/dL    Hematocrit 44.0 37.0 - 54.0 %    MCV 94.4 80.0 - 99.9 fL    MCH 32.2 26.0 - 35.0 pg    MCHC 34.1 32.0 - 34.5 %    RDW 13.2 11.5 - 15.0 fL    Platelets 134 113 - 671 E9/L    MPV 11.9 7.0 - 12.0 fL    Neutrophils % 61.6 43.0 - 80.0 %    Immature Granulocytes % 0.7 0.0 - 5.0 %    Lymphocytes % 24.2 20.0 - 42.0 %    Monocytes % 11.0 2.0 - 12.0 %    Eosinophils % 1.5 0.0 - 6.0 %    Basophils % 1.0 0.0 - 2.0 %    Neutrophils Absolute 4.42 1.80 - 7.30 E9/L    Immature Granulocytes # 0.05 E9/L    Lymphocytes Absolute 1.74 1.50 - 4.00 E9/L    Monocytes Absolute 0.79 0.10 - 0.95 E9/L    Eosinophils Absolute 0.11 0.05 - 0.50 E9/L    Basophils Absolute 0.07 0.00 - 0.20 E9/L   Troponin   Result Value Ref Range    Troponin, High Sensitivity 20 (H) 0 - 11 ng/L   Protime-INR   Result Value Ref Range    Protime 11.7 9.3 - 12.4 sec    INR 1.1    APTT   Result Value Ref Range    aPTT 25.3 24.5 - 35.1 sec   POCT Glucose   Result Value Ref Range    Meter Glucose 140 (H) 74 - 99 mg/dL   POCT Glucose   Result Value Ref Range    Glucose 140 mg/dL   EKG 12 Lead   Result Value Ref Range    Ventricular Rate 73 BPM    Atrial Rate 73 BPM    P-R Interval 192 ms    QRS Duration 86
6361 The following tests were interpreted by me:       [CD]   0905 WBC: 7.2 [CD]   0905 Hemoglobin Quant: 15.0 [CD]   0905 Platelet Count: 368 [CD]   8850 Meter Glucose(!): 140 [CD]      ED Course User Index  [CD] Janice Mercado MD  [RH] Ryley Merriam Shallow, DO          Medical Decision Making  Differential includes but not limited to ischemic stroke, hemorrhagic stroke, seizure. Based on patient's last known well and elevated NIH score, jorge protocol is initiated. 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 .  We were contacted by neuro endovascular as well as neurology. Based on last well-known, TNK was not recommended. However, based on the vascular occlusion interventional neurology/endovascular neurology recommended endovascular treatment. Conversation had with the patient including risks, benefit, alternative had by me as well as the proceduralist Dr. Althea Sanchez, included but not limited to worsening condition, death, permanent disability. Patient would like to move forward with this procedure. He has decision-making capacity. He understands the risks. My independent interpretation of the chest x-ray shows no pneumothorax. I independently interpreted the labs including CBC, chemistry panel, coagulation studies. Consult was placed to his family physician Dr. Aubrey Landa for admission. He will be admitted to neuro ICU after his procedure. My wet read independent interpretation of the noncontrast head CT does not show signs of stroke. Problems Addressed:  Embolic stroke involving basilar artery [facility]): acute illness or injury  Vertebral artery stroke [facility]): acute illness or injury    Amount and/or Complexity of Data Reviewed  Independent Historian: EMS  External Data Reviewed: notes. Labs: ordered. Decision-making details documented in ED Course. Radiology: ordered and independent interpretation performed.  Decision-making details

## 2023-03-25 NOTE — ANESTHESIA PROCEDURE NOTES
Arterial Line:    An arterial line was placed using ultrasound guidance, in the OR for the following indication(s): continuous blood pressure monitoring. A 22 gauge (size), 1 and 3/4 inch (length), Arrow (type) catheter was placed, Seldinger technique not used, into the radial artery, secured by tape and Tegaderm. Anesthesia type: General  Anesthesiologist: Kiarra Dunn MD  Other anesthesia staff: Winifred Stone RN  Performed:  Other anesthesia staff   Preanesthetic Checklist  Completed: patient identified, IV checked, site marked, risks and benefits discussed, surgical/procedural consents, equipment checked, pre-op evaluation, timeout performed, anesthesia consent given, oxygen available, monitors applied/VS acknowledged, fire risk safety assessment completed and verbalized and blood product R/B/A discussed and consented

## 2023-03-25 NOTE — OR NURSING
Rt groin closure w/ 8F angioseal; seeping post deployment; angioseal placed. Angioseal will need removed in 24 hours, so 3/26/23 remove at 1115. Site marked w/ sticker, will relay in bedside report to PACU RN. Rt radial 5F closure w/ TR band inflated to 14ml. Will need to start deflating at 1200. Will place nursing communication order for TR band deflation protocol per Dr. David Check. Relayed to PACU RN.

## 2023-03-25 NOTE — OR NURSING
After 2 passes w/ reperfusion grade 0; Dr. Shira Parra reviewing images and switching approach to groin.

## 2023-03-25 NOTE — ED NOTES
Time Neurologist XGUN:1085      Time Stroke Alert called:  :  Tamia Pace alert   9204  Time Neurologist called back:    X-Ray/CT notified: Geoffrey Carter  03/25/23 1231

## 2023-03-25 NOTE — ED NOTES
Radiology Procedure Waiver   Name: Johnathon Garcia  : 1934  MRN: [id number]    Date:  3/25/23    Time: 8:18 AM EDT    Benefits of immediately proceeding with Radiology exam(s) without pre-testing outweigh the risks or are not indicated as specified below and therefore the following is/are being waived:    [] Pregnancy test   [] Patients LMP on-time and regular.   [] Patient had Tubal Ligation or has other Contraception Device. [] Patient  is Menopausal or Premenarcheal.    [] Patient had Full or Partial Hysterectomy. [] Protocol for Iodine allergy    [] MRI Questionnaire     [x] BUN/Creatinine   [] Patient age w/no hx of renal dysfunction. [] Patient on Dialysis. [] Recent Normal Labs.   Electronically signed by Carola Barrett DO on 3/25/23 at 8:18 AM EDT              Carola Barrett DO  Resident  23 [phone]

## 2023-03-25 NOTE — PROCEDURES
NEUROINTERVENTION PROCEDURE NOTE    PATIENT NAME: Michele Wiggins  MRN: [id number]  : 1934  DATE OF PROCEDURE: 23    Stroke Metrics  NIHSS prior to procedure: 11  IV TPA Administered: [] Yes  [x]  No  Consent obtained: [x] Yes  []  No  by Patient's wife Isrrael Friedman  Pedal Pulses checked: +2 radial +1 DP bilaterally    Neurointerventionalist: Charlene Dudley MD  1st assistant Abby Lacey      Time Event Device Notes   4852 Access site puncture  5F Glidesheath Right radial w/ radial cocktail w/ heparin     0942 1st pass suction TICI Reperfusion stgstrstastdstest:st st1st 1005 2nd pass suction TICI Reperfusion grade:0; 1010 Dr. James Port reviewing images; switching to groin access for different approach 1012   1017 Access site puncture  8F Brite Tip Sheath  Right Groin   1038 3rd pass suction TICI Reperfusion thgthrthathdtheth:th4th 1100 4th pass stent Zach [place] 3.5mm x 12mm    1115 Access site closure-Rt Radial TR band inflated to 14mL Sheath pulled and TR BAND used to close arterial puncture. Puncture site cleansed and dry dressing applied. No bleeding, swelling or complications noted, unable to re-assess rt radial pulse. 1115 Access site closure-Rt Groin  8F angioseal Sheath pulled and  ANGIOSEAL used to close arterial puncture. Puncture site cleansed and dry dressing applied. Seeping site-ANGIOSEAL placed, no change in pulses. Dressing dry and intact. IA tPA administered: [] Yes  [x]  No-Integrilin GTT infusing         Post-procedure NIHSS unable to assess due to anesthesia/sedation    1105  CT head completed.     1140 Transport to CT for another post CT Head w/ anesthesia and RT     1150  Patient transported to PACU  and handoff report given to PACU RN    Family updated: [x] Yes  []  No    Blood pressure parameters: SBP <140

## 2023-03-26 PROBLEM — I63.12 EMBOLIC STROKE INVOLVING BASILAR ARTERY (HCC): Status: ACTIVE | Noted: 2023-01-01

## 2023-03-26 PROBLEM — I63.219: Status: ACTIVE | Noted: 2023-01-01

## 2023-03-26 NOTE — H&P
[address]                  [address] [facility],  [facility]                              HISTORY AND PHYSICAL    PATIENT NAME: Shaheen Hui                     :        1934  MED REC NO:   [id number]                            ROOM:       7221  ACCOUNT NO:   [de-identified]                           ADMIT DATE: 2023  PROVIDER:     Frank Langston MD    CHIEF COMPLAINT:  Acute stroke. HISTORY OF PRESENT ILLNESS:  The patient is an 80-year-old male with  history of hypertension and hyperlipidemia who lives with his wife. She  states that on the morning of admission, approximately 07:30 a.m., he  developed onset of symptoms. He was fine just prior to that, in fact he  was feeding their pets and taking care of normal activities  approximately 07:30 a.m. He developed some slurred speech, facial  asymmetry and left-sided weakness prompting call 911. He has no similar  previous episodes. MEDICATIONS:  See chart. PAST MEDICAL HISTORY:  Hypertension, hyperlipidemia, cholecystectomy and  hernia repair. SOCIAL HISTORY:  No alcohol. No tobacco.    FAMILY HISTORY:  Not available. REVIEW OF SYSTEMS:  The patient is unresponsive on ventilator, unable to  provide any review of systems. PHYSICAL EXAMINATION:  GENERAL:  The patient is a sedated 80-year-old male, intubated on  ventilator. He is unresponsive due to sedation. HEENT:  Head normal size and shape. Pupils small, poorly responsive. Oral mucous membranes were clear and adequately hydrated. NECK:  Supple. Neck veins were flat. No jugular venous distention was  appreciated. No carotid bruits were noted. LUNGS:  Diminished breath sounds bilaterally, but clear. CARDIOVASCULAR:  Regular rate and rhythm with distant tones. ABDOMEN:  Soft, flat. No palpable masses. Bowel sounds diminished. Femoral pulses symmetric. GENITOURINARY:  Deferred. RECTAL:  Deferred.   EXTREMITIES:  No

## 2023-03-27 NOTE — CONSULTS
Comprehensive Nutrition Assessment    Type and Reason for Visit:  Initial, Consult (TF recs)    Nutrition Recommendations/Plan:   Continue NPO  Modify TF to better meet estimated nutrient needs    TF rec:  Fluid Restricted (TwoCal HN) at 35 ml/hr + 1 protein modular daily  Provides 840 ml tv, 1680 kcals, 70 gm pro (1780 kcals, 96 gm pro w/ mod), 588 ml free water  Regimen meets 94% estimated calorie needs &100% estimated protein needs  Will defer flush recs to neuro/critical care management     Malnutrition Assessment:  Malnutrition Status: At risk for malnutrition (Comment) (03/27/23 1445)    Context:  Acute Illness     Findings of the 6 clinical characteristics of malnutrition:  Energy Intake:  Mild decrease in energy intake (Comment)  Weight Loss:  Unable to assess (no wt hx on file)     Body Fat Loss:  No significant body fat loss     Muscle Mass Loss:  No significant muscle mass loss    Fluid Accumulation:  No significant fluid accumulation     Strength:  Not Performed    Nutrition Assessment:    Pt admit 2/2 CVA s/p thrombectomy w/ noted respiratory distress s/p intubation. Pt currently intubated/sedated w/ need for EN. Hx HTN, HLD. Will provide updated TF recs and continue to monitor.     Nutrition Related Findings:    pt intubated/sedated, OGT w/ TF, abd WDL, no noted edema, +I/Os 3.5L, hypophosphatemia, MAP WNL Wound Type: Open Wounds (puncture site)       Current Nutrition Intake & Therapies:    Average Meal Intake: NPO     Diet NPO  ADULT TUBE FEEDING; Orogastric; 2.0 Calorie; Continuous; 15; Yes; 15; Q 4 hours; 30; 30; Q 4 hours  Current Tube Feeding (TF) Orders:  Feeding Route: Orogastric  Formula: 2.0 Calorie  Schedule: Continuous  Feeding Regimen: 30 ml/hr  Additives/Modulars: None  Water Flushes: 30 ml q 4 hr = 180 ml water  Current TF & Flush Orders Provides: 720 ml tv, 1440 kcals, 60 gm pro, 504 ml free water  Goal TF & Flush Orders Provides: TF running at goal    Anthropometric
discussed with neuro nurse practitioner. All questions answered at this time. We will continue to follow for further goals of care. OBJECTIVE:   Prognosis: Guarded    ROS:   KEZIA, intubated/sedated    Physical Exam:  BP (!) 145/64   Pulse (!) 113   Temp 99.9 °F (37.7 °C) (Temporal)   Resp 20   Ht 5' 10\" (1.778 m)   Wt 202 lb 13.2 oz (92 kg)   SpO2 94%   BMI 29.10 kg/m²   Constitutional:  Elderly, intubated  Lungs:  CTA bilaterally, no audible rhonchi or wheezes noted, respirations unlabored, no retractions, +PS vent support  Heart:  RRR, no murmur, rub, or gallop noted during exam  Abd:  Soft, non tender, non distended, bowel sounds present  Ext:  Moving all extremities, no edema, pulses present  Skin:  Warm and dry, no rashes on visible skin  Psych: non-anxious affect  Neuro:  Alert; following commands    Objective data reviewed: labs, images, records, medication use, vitals, and chart    Discussed patient and the plan of care with the other IDT members: Palliative Medicine IDT Team and Family, ICU Neuro NP    Time/Communication  Greater than 50% of time spent, total 55 minutes in counseling and coordination of care at the bedside regarding  CODE STATUS discussion, goals of care, and diagnosis and prognosis. Thank you for allowing Palliative Medicine to participate in the care of Bennett Barrett.

## 2023-03-29 NOTE — ACP (ADVANCE CARE PLANNING)
Advance Care Planning   Healthcare Decision Maker:    Primary Decision Maker: Chiara Cristal - Spouse - [phone]    Click here to complete Healthcare Decision Makers including selection of the Healthcare Decision Maker Relationship (ie \"Primary\").

## 2023-03-30 NOTE — SIGNIFICANT EVENT
Was called to the room by nursing staff patient for patient being unresponsive. Upon arrival patient was found to have:   Absent pulse in all 4 extremities   Rhythm strip asystole   Absent breath sounds on auscultation   Absent heart sounds on auscultaion   Carotid pulses absent   Pupil reflexes absent   No gag reflex present   No movement to pain stimulation, no movement upon sternal rub   No movement with verbal stimulation    The patient was pronounced dead at [address] on 3/30/2023.     Freda Lee, DO

## 2023-03-30 NOTE — CARE COORDINATION
Care Coordination  The patient remains in [place] with vent support. Attempt weaning parameters and the patient was unable to follow directions regarding vital capacity, best effort was 400 mL. Per Palliative care  Family does not want the patient re-intubated. His code status was changed to a Limited code. His plan is to [facility] [place] or acute rehab  pending progress.  Will follow
Patient accepted at [facility] and to discharge there today. HOTV liaison arranging transport. For questions I can be reached at [phone].  Lev English [place]
Patient continues with vent support. Off propofol today. Single point restraint. Spouse choiced previously for [facility] or ARU pending progress. For questions I can be reached at [phone].  Avelino Guerin, [place]
community resources:    Current services prior to admission: None            Current DME:              Type of Home Care services:  None    ADLS  Prior functional level: Independent in ADLs/IADLs  Current functional level: Assistance with the following:, Bathing, Dressing, Toileting, Feeding, Cooking, Mobility, Shopping, Housework    PT AM-PAC:   /24  OT AM-PAC:   /24    Family can provide assistance at DC: Yes  Would you like Case Management to discuss the discharge plan with any other family members/significant others, and if so, who? Yes (spouse)  Plans to Return to Present Housing: No  Other Identified Issues/Barriers to RETURNING to current housing: vent dependence  Potential Assistance needed at discharge: N/A            Potential DME:    Patient expects to discharge to: [address] [place] [address] for transportation at discharge:      Financial    Payor: [address] [place] [address],[address] / Plan: MEDICARE PART A AND B / Product Type: *No Product type* /     Does insurance require precert for SNF: No    Potential assistance Purchasing Medications:    Meds-to-Beds request:        [address] [facility] [place] - Niharika Gonsalves Jaquan [phone]  [address] [address]  Phone: [phone] Fax: [phone]      Notes:    Factors facilitating achievement of predicted outcomes: Family support    Barriers to discharge: Cognitive deficit, Upper extremity weakness, and Lower extremity weakness    Additional Case Management Notes: The Plan for Transition of Care is related to the following treatment goals of 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  Freedom of choice list with basic dialogue that supports the

## 2023-03-30 NOTE — PLAN OF CARE
Problem: Safety - Adult  Goal: Free from fall injury  3/25/2023 2346 by Fran Vaughn RN  Outcome: Progressing  3/25/2023 1750 by Shakeel Werner RN  Outcome: Progressing  Flowsheets (Taken 3/25/2023 1750)  Free From Fall Injury: Instruct family/caregiver on patient safety     Problem: Discharge Planning  Goal: Discharge to home or other facility with appropriate resources  3/25/2023 2346 by Fran Vaughn RN  Outcome: Progressing  3/25/2023 1750 by Shakeel Werner RN  Outcome: Progressing     Problem: Pain  Goal: Verbalizes/displays adequate comfort level or baseline comfort level  3/25/2023 2346 by Fran Vaughn RN  Outcome: Progressing  3/25/2023 1750 by Shakeel Werner RN  Outcome: Progressing     Problem: ABCDS Injury Assessment  Goal: Absence of physical injury  3/25/2023 2346 by Fran Vaughn RN  Outcome: Progressing  3/25/2023 1750 by Shakeel Werner RN  Outcome: Progressing  Flowsheets (Taken 3/25/2023 1750)  Absence of Physical Injury: Implement safety measures based on patient assessment     Problem: Skin/Tissue Integrity  Goal: Absence of new skin breakdown  Description: 1. Monitor for areas of redness and/or skin breakdown  2. Assess vascular access sites hourly  3. Every 4-6 hours minimum:  Change oxygen saturation probe site  4. Every 4-6 hours:  If on nasal continuous positive airway pressure, respiratory therapy assess nares and determine need for appliance change or resting period. 3/25/2023 2346 by Fran Vaughn RN  Outcome: Progressing  3/25/2023 1750 by Shakeel Werner RN  Outcome: Progressing     Problem: Safety - Medical Restraint  Goal: Remains free of injury from restraints (Restraint for Interference with Medical Device)  Description: INTERVENTIONS:  1. Determine that other, less restrictive measures have been tried or would not be effective before applying the restraint  2. Evaluate the patient's condition at the time of restraint application  3.  Inform patient/family
Problem: Safety - Adult  Goal: Free from fall injury  3/26/2023 2146 by Ben Fair RN  Outcome: Progressing  4 H Jono Vallejo (Taken 3/26/2023 2000)  Free From Fall Injury: Instruct family/caregiver on patient safety  3/26/2023 1323 by Jose M Raygoza RN  Outcome: Progressing     Problem: Discharge Planning  Goal: Discharge to home or other facility with appropriate resources  3/26/2023 2146 by Ben Fair RN  Outcome: Progressing  Flowsheets (Taken 3/26/2023 2000)  Discharge to home or other facility with appropriate resources: Identify barriers to discharge with patient and caregiver  3/26/2023 1323 by Jose M Raygoza RN  Outcome: Progressing     Problem: Pain  Goal: Verbalizes/displays adequate comfort level or baseline comfort level  3/26/2023 2146 by Ben Fair RN  Outcome: Progressing  3/26/2023 1323 by Jose M Raygoza RN  Outcome: Progressing  Flowsheets (Taken 3/26/2023 0800)  Verbalizes/displays adequate comfort level or baseline comfort level:   Assess pain using appropriate pain scale   Administer analgesics based on type and severity of pain and evaluate response   Implement non-pharmacological measures as appropriate and evaluate response     Problem: ABCDS Injury Assessment  Goal: Absence of physical injury  3/26/2023 2146 by Ben Fair RN  Outcome: Progressing  Flowsheets (Taken 3/26/2023 2000)  Absence of Physical Injury: Implement safety measures based on patient assessment  3/26/2023 1323 by Jose M Raygoza RN  Outcome: Progressing  Flowsheets (Taken 3/26/2023 1323)  Absence of Physical Injury: Implement safety measures based on patient assessment     Problem: Skin/Tissue Integrity  Goal: Absence of new skin breakdown  Description: 1. Monitor for areas of redness and/or skin breakdown  2. Assess vascular access sites hourly  3. Every 4-6 hours minimum:  Change oxygen saturation probe site  4.   Every 4-6 hours:  If on nasal continuous positive airway pressure, respiratory therapy assess nares and
Problem: Safety - Adult  Goal: Free from fall injury  3/28/2023 2053 by Shobha Banegas RN  Outcome: Kallei Barajas (Taken 3/26/2023 2000 by Valeria Olson RN)  Free From Fall Injury: Instruct family/caregiver on patient safety     Problem: Pain  Goal: Verbalizes/displays adequate comfort level or baseline comfort level  3/28/2023 2053 by Shobha Banegas RN  Outcome: Progressing  Flowsheets (Taken 3/28/2023 2053)  Verbalizes/displays adequate comfort level or baseline comfort level:   Assess pain using appropriate pain scale   Administer analgesics based on type and severity of pain and evaluate response   Implement non-pharmacological measures as appropriate and evaluate response   Consider cultural and social influences on pain and pain management   Notify Licensed Independent Practitioner if interventions unsuccessful or patient reports new pain     Problem: ABCDS Injury Assessment  Goal: Absence of physical injury  3/28/2023 2053 by Shobha Banegas RN  Outcome: Kallie Barajas (Taken 3/26/2023 2000 by Valeria Olson RN)  Absence of Physical Injury: Implement safety measures based on patient assessment     Problem: Skin/Tissue Integrity  Goal: Absence of new skin breakdown  Description: 1. Monitor for areas of redness and/or skin breakdown  2. Assess vascular access sites hourly  3. Every 4-6 hours minimum:  Change oxygen saturation probe site  4. Every 4-6 hours:  If on nasal continuous positive airway pressure, respiratory therapy assess nares and determine need for appliance change or resting period. 3/28/2023 2053 by Shobha Banegas RN  Outcome: Progressing     Problem: Safety - Medical Restraint  Goal: Remains free of injury from restraints (Restraint for Interference with Medical Device)  Description: INTERVENTIONS:  1. Determine that other, less restrictive measures have been tried or would not be effective before applying the restraint  2.  Evaluate the patient's condition at the time
Problem: Safety - Adult  Goal: Free from fall injury  Outcome: Progressing     Problem: Discharge Planning  Goal: Discharge to home or other facility with appropriate resources  Outcome: Progressing     Problem: Pain  Goal: Verbalizes/displays adequate comfort level or baseline comfort level  Outcome: Progressing     Problem: ABCDS Injury Assessment  Goal: Absence of physical injury  Outcome: Progressing     Problem: Skin/Tissue Integrity  Goal: Absence of new skin breakdown  Description: 1. Monitor for areas of redness and/or skin breakdown  2. Assess vascular access sites hourly  3. Every 4-6 hours minimum:  Change oxygen saturation probe site  4. Every 4-6 hours:  If on nasal continuous positive airway pressure, respiratory therapy assess nares and determine need for appliance change or resting period. Outcome: Progressing     Problem: Safety - Medical Restraint  Goal: Remains free of injury from restraints (Restraint for Interference with Medical Device)  Description: INTERVENTIONS:  1. Determine that other, less restrictive measures have been tried or would not be effective before applying the restraint  2. Evaluate the patient's condition at the time of restraint application  3. Inform patient/family regarding the reason for restraint  4.  Q2H: Monitor safety, psychosocial status, comfort, nutrition and hydration  Outcome: Progressing     Problem: Chronic Conditions and Co-morbidities  Goal: Patient's chronic conditions and co-morbidity symptoms are monitored and maintained or improved  Outcome: Progressing
Problem: Safety - Medical Restraint  Goal: Remains free of injury from restraints (Restraint for Interference with Medical Device)  Description: INTERVENTIONS:  1. Determine that other, less restrictive measures have been tried or would not be effective before applying the restraint  2. Evaluate the patient's condition at the time of restraint application  3. Inform patient/family regarding the reason for restraint  4.  Q2H: Monitor safety, psychosocial status, comfort, nutrition and hydration  Outcome: Progressing  Flowsheets (Taken 3/25/2023 1045)  Remains free of injury from restraints (restraint for interference with medical device):   Determine that other, less restrictive measures have been tried or would not be effective before applying the restraint   Evaluate the patient's condition at the time of restraint application   Inform patient/family regarding the reason for restraint   Every 2 hours: Monitor safety, psychosocial status, comfort, nutrition and hydration
be effective before applying the restraint  2. Evaluate the patient's condition at the time of restraint application  3. Inform patient/family regarding the reason for restraint  4.  Q2H: Monitor safety, psychosocial status, comfort, nutrition and hydration  3/26/2023 1323 by Alek Waterman, RN  Outcome: Progressing  Flowsheets (Taken 3/26/2023 0800)  Remains free of injury from restraints (restraint for interference with medical device):   Determine that other, less restrictive measures have been tried or would not be effective before applying the restraint   Evaluate the patient's condition at the time of restraint application   Inform patient/family regarding the reason for restraint   Every 2 hours: Monitor safety, psychosocial status, comfort, nutrition and hydration  3/25/2023 2346 by Lia Escobar RN  Outcome: Progressing  Flowsheets (Taken 3/25/2023 1747 by Alek Waterman, RN)  Remains free of injury from restraints (restraint for interference with medical device):   Determine that other, less restrictive measures have been tried or would not be effective before applying the restraint   Evaluate the patient's condition at the time of restraint application   Inform patient/family regarding the reason for restraint   Every 2 hours: Monitor safety, psychosocial status, comfort, nutrition and hydration
for appliance change or resting period. 3/30/2023 1013 by Sahra Chu RN  Outcome: Progressing  3/29/2023 2304 by Guille Navarrete RN  Outcome: Progressing     Problem: Safety - Medical Restraint  Goal: Remains free of injury from restraints (Restraint for Interference with Medical Device)  Description: INTERVENTIONS:  1. Determine that other, less restrictive measures have been tried or would not be effective before applying the restraint  2. Evaluate the patient's condition at the time of restraint application  3. Inform patient/family regarding the reason for restraint  4.  Q2H: Monitor safety, psychosocial status, comfort, nutrition and hydration  3/30/2023 1013 by Sahra Chu RN  Outcome: Progressing  3/29/2023 2304 by Guille Navarrete RN  Outcome: Progressing  Flowsheets (Taken 3/26/2023 2256 by Ender Strong RN)  Remains free of injury from restraints (restraint for interference with medical device):   Determine that other, less restrictive measures have been tried or would not be effective before applying the restraint   Evaluate the patient's condition at the time of restraint application   Inform patient/family regarding the reason for restraint   Every 2 hours: Monitor safety, psychosocial status, comfort, nutrition and hydration     Problem: Chronic Conditions and Co-morbidities  Goal: Patient's chronic conditions and co-morbidity symptoms are monitored and maintained or improved  3/30/2023 1013 by Sahra Chu RN  Outcome: Progressing  3/29/2023 2304 by Guille Navarrete RN  Outcome: Progressing  Flowsheets (Taken 3/28/2023 2053)  Care Plan - Patient's Chronic Conditions and Co-Morbidity Symptoms are Monitored and Maintained or Improved:   Monitor and assess patient's chronic conditions and comorbid symptoms for stability, deterioration, or improvement   Collaborate with multidisciplinary team to address chronic and comorbid conditions and prevent exacerbation or deterioration   Update acute care
reason for restraint   Every 2 hours: Monitor safety, psychosocial status, comfort, nutrition and hydration
Provide specific nutrition education to patient or family as appropriate     Problem: Neurosensory - Adult  Goal: Achieves stable or improved neurological status  Outcome: Progressing  Flowsheets (Taken 3/27/2023 2200 by Zack Alford RN)  Achieves stable or improved neurological status:   Assess for and report changes in neurological status   Maintain blood pressure and fluid volume within ordered parameters to optimize cerebral perfusion and minimize risk of hemorrhage   Monitor temperature, glucose, and sodium.  Initiate appropriate interventions as ordered     Problem: Neurosensory - Adult  Goal: Achieves maximal functionality and self care  Outcome: Progressing  Flowsheets (Taken 3/27/2023 2200 by Zack Alford RN)  Achieves maximal functionality and self care:   Monitor swallowing and airway patency with patient fatigue and changes in neurological status   Encourage visually impaired, hearing impaired and aphasic patients to use assistive/communication devices
restraint   Every 2 hours: Monitor safety, psychosocial status, comfort, nutrition and hydration     Problem: Chronic Conditions and Co-morbidities  Goal: Patient's chronic conditions and co-morbidity symptoms are monitored and maintained or improved  Outcome: Progressing     Problem: Nutrition Deficit:  Goal: Optimize nutritional status  Outcome: Progressing     Problem: Neurosensory - Adult  Goal: Achieves stable or improved neurological status  3/28/2023 0911 by Warden Trung RN  Outcome: Progressing  3/27/2023 2200 by Prachi Dick RN  Outcome: Progressing  Flowsheets (Taken 3/27/2023 2200)  Achieves stable or improved neurological status:   Assess for and report changes in neurological status   Maintain blood pressure and fluid volume within ordered parameters to optimize cerebral perfusion and minimize risk of hemorrhage   Monitor temperature, glucose, and sodium.  Initiate appropriate interventions as ordered  Goal: Achieves maximal functionality and self care  3/28/2023 0911 by Warden Trung RN  Outcome: Progressing  3/27/2023 2200 by Prachi Dick RN  Outcome: Progressing  Flowsheets (Taken 3/27/2023 2200)  Achieves maximal functionality and self care:   Monitor swallowing and airway patency with patient fatigue and changes in neurological status   Encourage visually impaired, hearing impaired and aphasic patients to use assistive/communication devices
sodium.  Initiate appropriate interventions as ordered     Problem: Neurosensory - Adult  Goal: Achieves maximal functionality and self care  Outcome: Progressing  Flowsheets (Taken 3/27/2023 2200)  Achieves maximal functionality and self care:   Monitor swallowing and airway patency with patient fatigue and changes in neurological status   Encourage visually impaired, hearing impaired and aphasic patients to use assistive/communication devices
with appropriate goals if chronic or comorbid symptoms are exacerbated and prevent overall improvement and discharge     Problem: Nutrition Deficit:  Goal: Optimize nutritional status  3/29/2023 0825 by Sahra Chu RN  Outcome: Progressing  3/28/2023 2053 by Guille Navarrete RN  Outcome: Progressing  Flowsheets (Taken 3/28/2023 2053)  Nutrient intake appropriate for improving, restoring, or maintaining nutritional needs:   Assess nutritional status and recommend course of action   Monitor oral intake, labs, and treatment plans   Recommend appropriate diets, oral nutritional supplements, and vitamin/mineral supplements   Order, calculate, and assess calorie counts as needed   Recommend, monitor, and adjust tube feedings and TPN/PPN based on assessed needs   Provide specific nutrition education to patient or family as appropriate     Problem: Neurosensory - Adult  Goal: Achieves stable or improved neurological status  3/29/2023 0825 by Sahra Chu RN  Outcome: Progressing  3/28/2023 2053 by Guille Navarrete RN  Outcome: Progressing  Flowsheets (Taken 3/27/2023 2200 by Eulalia Dolan RN)  Achieves stable or improved neurological status:   Assess for and report changes in neurological status   Maintain blood pressure and fluid volume within ordered parameters to optimize cerebral perfusion and minimize risk of hemorrhage   Monitor temperature, glucose, and sodium.  Initiate appropriate interventions as ordered  Goal: Achieves maximal functionality and self care  3/29/2023 0825 by Sahra Chu RN  Outcome: Progressing  3/28/2023 2053 by Guille Navarrete RN  Outcome: Progressing  Flowsheets (Taken 3/27/2023 2200 by Eulalia Dolan RN)  Achieves maximal functionality and self care:   Monitor swallowing and airway patency with patient fatigue and changes in neurological status   Encourage visually impaired, hearing impaired and aphasic patients to use assistive/communication devices

## 2023-03-31 LAB
BACTERIA BLD CULT ORG #2: NORMAL
BACTERIA BLD CULT: NORMAL

## 2023-03-31 NOTE — PROGRESS NOTES
03/25/23 1630   Patient Observation   Heart Rate 69   Resp 16   SpO2 99 %   Ventilator Settings   FiO2  50 %   Vt (Set, mL) 500 mL   Resp Rate (Set) 14 bmp   PEEP/CPAP (cmH2O) 5   Vent Patient Data (Readings)   Vt (Measured) 482 mL   Rate Measured 14 br/min   Minute Volume (L/min) 6.95 Liters   Peak Inspiratory Flow (lpm) 38 L/sec   Flow Sensitivity 3 L/min       Found on 40%, changed by Julio Cesar Lauren. CNP per RN
36- Dr. Efrem Tuttle notified that patient has had a total of 35cc urine output in the last 2 hours. Awaiting further orders. 36- Dr. Efrem Tuttle aware and no new orders given.
4 Eyes Skin Assessment     NAME:  Gila Wolfe  YOB: 1934  MEDICAL RECORD NUMBER:  [id number]    The patient is being assessed for  Admission    I agree that One RN has performed a thorough Head to Toe Skin Assessment on the patient. ALL assessment sites listed below have been assessed. Areas assessed by both nurses:    Head, Face, Ears, Shoulders, Back, Chest, Arms, Elbows, Hands, Sacrum. Buttock, Coccyx, Ischium, and Legs. Feet and Heels        Does the Patient have a Wound?  No noted wound(s)       Tod Prevention initiated by RN: Yes   Wound Care Orders initiated by RN: NA    Pressure Injury (Stage 3,4, Unstageable, DTI, NWPT, and Complex wounds) if present, place referral order by RN under : NA    New and Established Ostomies, if present place, referral order under : NA      Nurse 1 eSignature: Electronically signed by Ziggy Smith RN on 3/25/23 at 5:46 PM EDT    **SHARE this note so that the co-signing nurse can place an eSignature**    Nurse 2 eSignature: Electronically signed by Nell Houston RN on 3/25/23 at 5:48 PM EDT
Admitted to Neuro ICU. Neuro reassessment/;  Remains intubated on mechanical ventilation. Propofol at 20 mcg/kg. Opening eyes to name. Pupils reactive at 4 mm bilaterally. Able to do thumbs up on right. Wiggles right fingers. Wiggles toes on right . Slight toe wiggle on left foot. No movement noted in left fingers or thumb. Dr. Tereza Ceron notified.
Anjel Mccormack for Neurology notified via [employer] serve for consult
Assisted in transport of this patient.
Called spoke with Dr. Harleen Mccarthy need order for propofol drip
Chg double lumen picc Placement 3/27/2023    Product number: [id number]   Lot Number: [id number]      Ultrasound: yes   Left Brachial vein:                Upper Arm Circumference: (CM) 30cm    Size:(FR)/GUAGE 44cm/5.5fr    Exposed Length: (CM) 0    Internal Length: (CM) 44cm   Cut: (CM) 11cm   Vein Measurement: 0.60cm    Ashley Escamilla RN  3/27/2023  12:12 PM      Proceduralist-kerrie torres
Consult received and chart reviewed. Visit made to the bedside. Multiple family members present. Hospice philosophy and services discussed, all questions answered at this time. Pt was compassionately extubated today and has been receiving comfort medications. Family would like to proceed with hospice. 1400 Call placed to [facility], APRN-CNP, She has accepted the pt for GIP level of care at the hospice house. Wendy Cotter 38. signed. Advised the nurse to leave IV sites and west cath intact. Pt to be transported via PAS at 1800.      Electronically signed by Yue Vega RN on 3/30/2023 at 3:00 PM  [phone]
Dr Chuck Soto notified of air hunger uncontrolled by morphine 2mg and ativan 0.5mg
Dr. Devan Pradhan notified that patient's phosphorus this morning was 1.4
Extended Infusion Policy     This patient is on medication that requires renal, weight, and/or indication dose adjustment. Date Body Weight IBW  Adjusted BW SCr  CrCl Dialysis status BMI   3/30/2023 207 lb 14.3 oz (94.3 kg) Ideal body weight: 73 kg (160 lb 15 oz)  Adjusted ideal body weight: 81.5 kg (179 lb 11.5 oz) Serum creatinine: 0.9 mg/dL 03/30/23 0451  Estimated creatinine clearance: 65 mL/min N/a Body mass index is 29.83 kg/m². Pharmacy has dose-adjusted the following medication(s):    Ordered Medication: Zosyn 3375mg q8H     Order Changed/converted to: Zosyn 4500mg q8h    These changes were made per protocol according to the [facility]   [employer] Extended Infusion Dose Adjustment Policy. *Please note this dose may need readjusted if patient's condition changes. Please contact pharmacy with any questions regarding these changes.     Deandre Becerra RPH  3/30/2023  9:02 AM
Family at bedside:  Wife, daughters & son. Updated them on MRI results. Discussed current condition. Discussed current code status. Discussed code status options. Encouraged them to discuss his code status. Many questions answered    Plan: family to discuss codes status.
Family requested Nirav Carreon .  Made aware of referral.
Febrile with worsening leukocytosis  Somewhat responsive at times  RRR with distant tones  Increased bilateral rhonchi  Discussed everything with multiple family members  Hospice to see him
[facility] notified of patient's expiration.
I did see the patient today and talked to family . He is able to follow commans, Still has left sided weakness, Points to his wife , Has an MADELEINE.      Nothing much to add in terms of management at this time
I examined the patient this morning and found him to be A&Ox3, following commands and answering questions appropriately. I explained to his daughter that I had a conversation with her father this morning regarding his care and he was responding to my questions appropriately. She wanted to know if the patient understood the extent of his stroke. I explained that I did not have the opportunity to have this conversation with him. Shortly after 10 am, I was informed that the wife and all off the children were in the room and wanted to talk to me. I went to the patient room to talk with everyone, I was told that they did not want to talk in front of the patient. I explained that I was under the understanding that they wanted him to know his prognosis. I was told again, by the entire family, that we were not to discuss with the patient present. I followed the family to the murillo where I was told by the wife that she is \"the durable power of  for healthcare\" and they have been  for 60 years. She does not want anything discussed with the patient because \"it will just upset him and make him agitated\". I was told that they wanted to make him a comfort care only and to stop all treatments now. I was told they wanted him to go to hospice and they wanted no further treatments. She told me she does not want him to have any pain. I placed orders for terminal extubation, the RN medicated the patient and respiratory was notified of the extubation. The patient was extubated, he had a large amount of secretions on the tube and was having difficulty clearing the remaining secretions from his throat. He was suctioned successfully. Dr. Jayla Suarez was made aware of the family's wishes prior to the extubation. Hospice has been consulted.
Intensive Care Unit  Critical Care Consult  Daily Progress Note 3/27/2023    Date of Admission: 3/25    EVENTS:   80 yr old man presented to ED with left sided weakness & dysarthria. LKW was 2130. Initial /88. Kathy alert called. NIH SS 12. Diagnostic imaging revealed thrombosis & near occlusion of distal vertebral arteries involving the V4 segment with extension into the proximal basilar artery, severe stenosis of P1 segment of right PCA. Decision made to undergo thrombectomy. Found to have bilateral vertebral artery occlusion. Successful thrombectomy of left vertebral artery. Persistent occlusion of right vertebral artery. Intracranial stent placed in basilar arty-left vertebral artery. During procedure, developed respiratory distress, require intubation. Admitted to PACU. Intubated. Propofol. Integrilin  3/26 Hypotensive this am.  Responded to fluids. 3/27 Pt starting to follow commands this AM, family would like to keep full code, started on 3%    PHYSICAL EXAM:    BP (!) 145/64   Pulse (!) 113   Temp 99.9 °F (37.7 °C) (Temporal)   Resp 20   Ht 5' 10\" (1.778 m)   Wt 202 lb 13.2 oz (92 kg)   SpO2 94%   BMI 29.10 kg/m²     General appearance:  Comfortable. Pain Description: none    GCS:    3 - Opens eyes to loud noise or command   6 - Follows simple motor commands  1-t - Makes no noise    Pupil size:  Left 3 mm  Right 3 mm  Pupil reaction: Yes  Wiggles fingers: Left No Right Yes  Hand grasp:   Left   absent     Right decreased  Wiggles toes: Left No    Right Yes  Plantar flexion: Left absent    Right absent    CONSTITUTIONAL: no acute distress, lying in hospital bed, intubated, no sedation  NEUROLOGIC: PERRL, weakly following commands on right side, flaccid left side   CARDIOVASCULAR: S1 S2, regular rate, regular rhythm, no murmur/gallop/rub.  Monitor: sinus  PULMONARY: no rhonchi/rales/wheezes, no use of accessory muscles, 14/5, 375  RENAL: west to gravity, clear yellow
Intensive Care Unit  Critical Care Consult  Daily Progress Note 3/29/2023    Date of Admission: 3/28    EVENTS:   80 yr old man presented to ED with left sided weakness & dysarthria. LKW was 2130. Initial /88. Kathy alert called. NIH SS 12. Diagnostic imaging revealed thrombosis & near occlusion of distal vertebral arteries involving the V4 segment with extension into the proximal basilar artery, severe stenosis of P1 segment of right PCA. Decision made to undergo thrombectomy. Found to have bilateral vertebral artery occlusion. Successful thrombectomy of left vertebral artery. Persistent occlusion of right vertebral artery. Intracranial stent placed in basilar arty-left vertebral artery. During procedure, developed respiratory distress, require intubation. Admitted to PACU. Intubated. Propofol. Integrilin  3/26 Hypotensive this am.  Responded to fluids. 3/27 Pt starting to follow commands this AM, family would like to keep full code, started on 3%  3/28 Placed back on [facility] overnight secondary to tachypnea, nodding appropriately, VSS  3/29 no events overnight. PST today, will obtain weaning parameters. PHYSICAL EXAM:    BP (!) 155/54   Pulse (!) 106   Temp 99.5 °F (37.5 °C) (Temporal)   Resp 24   Ht 5' 10\" (1.778 m)   Wt 208 lb 5.4 oz (94.5 kg)   SpO2 95%   BMI 29.89 kg/m²     General appearance:  Comfortable. Pain Description: none    GCS:    4 - Opens eyes on own   6 - Follows simple motor commands  1T - Makes no noise    Pupil size: Left 3 mm  Right 3 mm  Pupil reaction: Yes  Wiggles fingers: Left No Right Yes  Hand grasp:   Left absent     Right normal  Wiggles toes: Left No    Right Yes  Plantar flexion:  Left absent    Right normal    CONSTITUTIONAL: no acute distress, lying in hospital bed  NEUROLOGIC: PERRL, awake and alert  CARDIOVASCULAR: S1 S2, regular rate, regular rhythm, no murmur/gallop/rub.  Monitor: sinus tachycardia  PULMONARY: no rhonchi/rales/wheezes, no use of
Intensive Care Unit  Critical Care Consult  Daily Progress Note 3/30/2023    Date of Admission: 3/28    EVENTS:   80 yr old man presented to ED with left sided weakness & dysarthria. LKW was 2130. Initial /88. Kathy alert called. NIH SS 12. Diagnostic imaging revealed thrombosis & near occlusion of distal vertebral arteries involving the V4 segment with extension into the proximal basilar artery, severe stenosis of P1 segment of right PCA. Decision made to undergo thrombectomy. Found to have bilateral vertebral artery occlusion. Successful thrombectomy of left vertebral artery. Persistent occlusion of right vertebral artery. Intracranial stent placed in basilar arty-left vertebral artery. During procedure, developed respiratory distress, require intubation. Admitted to PACU. Intubated. Propofol. Integrilin  3/26 Hypotensive this am.  Responded to fluids. 3/27 Pt starting to follow commands this AM, family would like to keep full code, started on 3%  3/28 Placed back on [facility] overnight secondary to tachypnea, nodding appropriately, VSS  3/29 no events overnight. PST today, will obtain weaning parameters. 3/30 febrile with new leukocytosis overnight, blood and sputum cultures sent; vanc and zosyn started. Procalcitonin slightly elevated. Patient alert and oriented, no complaints of pain. Remains intubated, back on PS this morning and tolerating well; plan for weaning and possible extubation today. 3% stopped last night. PHYSICAL EXAM:    BP (!) 164/56   Pulse 89   Temp 100.3 °F (37.9 °C) (Temporal)   Resp 30   Ht 5' 10\" (1.778 m)   Wt 207 lb 14.3 oz (94.3 kg)   SpO2 96%   BMI 29.83 kg/m²     General appearance:  Comfortable.      Pain Description: none    GCS:    4 - Opens eyes on own   6 - Follows simple motor commands  1T - Makes no noise    Pupil size: Left 3 mm  Right 3 mm  Pupil reaction: Yes  Wiggles fingers: Left No Right Yes  Hand grasp:   Left absent     Right normal  Wiggles toes:
Left message with dietary requesting tube feed
Low grade fever with tachycardia and leukocytosis  Neuro appears unchanged  Sinus tachycardia  Decreased breath sounds   No peripheral edema  Discussed everything with family
Neuro Science Intensive Care Unit  Critical Care  Critical Care Initial Note 3/4/2023      Date of Admission: 03/25/2023    CC: Follow up for basilar  stroke    HOSPITAL COURSE/OVERNIGHT EVENTS:  80 yr old man presented to ED with left sided weakness & dysarthria. LKW was 2130. Initial /88. Kathy alert called. NIH SS 12. Diagnostic imaging revealed thrombosis & near occlusion of distal vertebral arteries involving the V4 segment with extension into the proximal basilar artery, severe stenosis of P1 segment of right PCA. Decision made to undergo thrombectomy. Found to have bilateral vertebral artery occlusion. Successful thrombectomy of left vertebral artery. Persistent occlusion of right vertebral artery. Intracranial stent placed in basilar arty-left vertebral artery. During procedure, developed respiratory distress, require intubation. Admitted to PACU. Intubated. Propofol. Integrilin. PMH:   Past Medical History:   Diagnosis Date    Chest pain 8/23/2016    Hyperlipidemia     Hypertension      PSH:   Past Surgical History:   Procedure Laterality Date    CHOLECYSTECTOMY      HERNIA REPAIR       Home Medications:   Prior to Admission medications    Medication Sig Start Date End Date Taking? Authorizing Provider   senna-docusate (PERICOLACE) 8.6-50 MG per tablet Take 2 tablets by mouth daily as needed for Constipation 11/2/16   Maria Elena Bauman,    simvastatin (ZOCOR) 20 MG tablet Take 20 mg by mouth nightly. Historical Provider, MD   Azilsartan Medoxomil (EDARBI) 80 MG TABS Take  by mouth daily. Historical Provider, MD   aspirin 81 MG EC tablet Take 81 mg by mouth daily.     Historical Provider, MD     Allergies: No Known Allergies    Social History:  Social History     Socioeconomic History    Marital status:      Spouse name: Not on file    Number of children: Not on file    Years of education: Not on file    Highest education level: Not on file   Occupational History    Not
Neurointervention Pre procedure Note      Medical record number:  [id number]      Procedure: Basilar thrombectomy  Indications:  Basilar stroke NIHSS of 11  Labs: The patient's record including history and physical, available labs and procedures, medications and allergies has been reviewed. PRE-PROCEDURE ATTESTATION STATEMENT  I have explained the potential risks, benefits, and side effects of the proposed procedure/treatment, including the risk of death to the patient and/or surrogate. Also, the possibility for the transfusion of blood or blood components (only if potential for transfusion is applicable) was discussed with risks, benefits, and alternatives. This explanation included discussion of the likelihood of the patient achieving his or her goals and any potential problems that might occur during recuperation. Reasonable alternatives to the proposed procedure/treatment including risks, benefits, and side effects were also discussed, as were the risks related to not receiving the proposed procedure/treatment. The patient and/or surrogate have elected to proceed with the proposed procedure/treatment. .      Sedation and/or anesthesia risk, benefits, and alternatives discussed and questions answered.      Vital Signs:  Vitals:    03/25/23 0821 03/25/23 0835 03/25/23 0836 03/25/23 0858   BP:  (!) 163/88  (!) 159/87   Pulse: 77   82   Resp: 16   18   Temp:   97.6 °F (36.4 °C)    TempSrc:   Oral    SpO2: 98%   97%   Weight: 195 lb (88.5 kg)      Height: 5' 10\" (1.778 m)            GENERAL: NPO: YES  ASA: ASA 5 - [place] patient who is not expected to survive another 24 hours with or without surgery  MALLAMPATI: III (soft palate, base of uvula visible)    Anesthesia Plan:  Plan for conscious sedation. / Please see anesthesia plan      Proceed with physician directed sedation to expedite things right now  Anesthesia informed    PLAN  Go ahead with thrombectomy
Neurointerventional POST Procedure Note      Date of Service: 23      Patient Name: Kylie Hawthorne   : 1934  Medical record number:  [id number]        Procedure: Bilateral vertebral artery and Basilar artery thrombectomy  Physician: Yolanda Waller MD  Assistant: Naif Browning  Access:Right Radial   Right femoral   Vessels injected:   Left Vert  Right vert  Hemostasis: Achieved 8 F angioseal   TR band  Anesthesia: Please see flowchart  Specimens: None  Blood loss: 300 ml   Contrast Material:  please see dictation in PACS  Fluoro time: Please see dictation in PACS      Diagnosis/Findings:   1. 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 Right Vertebral artery v4 segment - Persistent occlusion despite 3 attempts could not pass the occlusion   3. Basilar thrombus s/p successful thrombectomy through LEFT VERTEBRAL ARTERY    4. Residual Severe stenosis s/p Rescue Intracranial STENTING of the basilar artery- left vertebral artery      SIGNIFICANT EVENTS  Patient has intra procedural respiratory failure  just prior to clot retreival  Hence was intubated as part of stabilization of ABCs    Plan:  1. Q15 min neuro checks x2 hours, Q30 for 6 hours and q1h for 24 hours and then q4h/q6h till discharge   2. Maintain SBP <140 per SVIN Guidelines Class 2 Recommendation MAP always greater than 65   3. Neurovascular checks   4. MRI for prognostication   5. STAT CT head for any neurological decline and contact me immediately  6.  Antiplatelet Recs Integrellin drip for 24 hours    More recs based on Dual volume CT
Nurse to Nurse report called awaiting respiratory for transport.
Overall improved  Responding appropriately   Leukocytosis improved   RRR  Bilateral rhonchi  No peripheral edema
Palliative Care Department  [phone]  Palliative Care Progress Note  Provider VIKTOR Raymundo CNP     Julio Chaves  [id number]  Hospital Day: 5  Date of Initial Consult: 3/26/23  Referring Provider: LEON Denson  Palliative Medicine was consulted for assistance with: Goals of care, CODE STATUS discussion, family support    HPI:   Julio Chaves is a 80 y.o. with a medical history of HTN, HLD who was admitted on 3/25/2023 from home with a CHIEF COMPLAINT of slurred speech, facial asymmetry, left-sided weakness. ED work-up significant for: Lab work unremarkable. CTA head/neck showed thrombosis and near occlusion of the distal vertebral arteries intracranially involving the V4 segment and extension into the proximal basilar artery, severe stenosis involving the P1 segment of the right PCA, fetal circulation of the right PCA, moderate chronic microvascular disease within the periventricular white matter, small focus of encephalomalacia within the high right frontal lobe near the convexity, no perfusion mismatch. Patient underwent mechanical arterial thrombectomy of the left vertebral artery and the basilar artery with restoration of flow. 3/26 brain MRI showed multiple acute infarcts within the cerebellar hemispheres, left greater than right, right paracentral kimmy, left occipital and left temporal lobes, smaller infarcts are present within the left frontal and left parietal lobes, no acute intracranial hemorrhages, moderate chronic small vessel ischemic changes. Patient remains intubated. Palliative medicine consulted for further goals of care, CODE STATUS discussion, family support. ASSESSMENT/PLAN:     Pertinent Hospital Diagnoses     Acute basilar stroke s/p urgent thrombectomy    Palliative Care Encounter / Counseling Regarding Goals of Care  Please see detailed goals of care discussion as below  At this time, Julio Chaves, Does Not have capacity for medical decision-making.
Palliative Care LSW met with pts wife and family as they discussed goals with pts RN. Felix Morelos and family do not want any aggressive care at this point. She states she and pt had many prior discussions about this and they would like to focus on quality of life not quantity. They would like to discuss this with medical team.  She also has asked for hospice to be consulted when appropriate. Agency list provided, she chooses [address], would need order first.  Pts  has been visiting daily as well. Emotional support provided. RN to review with unit team and NP.
Palliative care consult sent to Simón Fuentes NP via [employer].
Patient continues to reach for lines and tubes with right hand. Educated on importance without evidence of learning. Right wrist restraint continued per order for patient safety.
Patient seen and examined earlier today  More responsive   RRR  Lungs mostly clear  Neuro unchanged  Long discussion with his wife today
Patient seen and examined today in PACU following thrombectomy  Intubated on vent  Sedated and unresponsive  RRR  Lungs mostly clear  Will discuss further with family
Patient to PACU & placed on appropriate monitors.       patient intubated sedated and unresponsive   Propofol iv 26.4ml per hour    or 50 mcg/kg/min  Eptifibatide 3.5ml per hour    or 0.5 mcg/kg/min
Patient to mri on previous ac settings and back without issue. Placed back on vent on ps. RN is aware.    03/26/23 1200   Patient Observation   Heart Rate 92   Resp 19   SpO2 96 %   Vent Information   Vent Mode CPAP/PS   Ventilator Settings   FiO2  40 %   PEEP/CPAP (cmH2O) 5   Pressure Support (cm H2O) 8 cm H2O   Vent Patient Data (Readings)   Vt (Measured) 417 mL   Peak Inspiratory Pressure (cmH2O) 14 cmH2O   Rate Measured 19 br/min   Minute Volume (L/min) 7.94 Liters   Mean Airway Pressure (cmH2O) 9 cmH20   Plateau Pressure (cm H2O) 14 cm H2O   Driving Pressure 9   I:E Ratio 1:1.40   Vent Alarm Settings   High Pressure (cmH2O) 50 cmH2O   Low Exhaled Vt (ml) 0 mL   Additional Respiratoray Assessments   Humidification Source Heated wire   Humidification Temp 37   Patient Transport   Time Spent Transporting 46-60   Transport Ventillation Type Transport vent   Transport From ICU   Transport Destination MRI   Transport Destination MRI   Transport Destination ICU   Emergency Equipment Included Yes
Patient was extubated to 4 liters/min via nasal cannula for comfort. Breath Sounds post extubation were diminished. Stridor was not present post extubation.       Performed by  Rodrigo Ferrari RCP
Pharmacy Consultation Note  (Antibiotic Dosing and Monitoring)    Initial consult date: 3/30/23  Consulting physician/provider: Donna QUINTERO  Drug: Vancomycin  Indication: Sepsis    Vancomycin has been discontinued   Clinical Pharmacy to sign-off  Physician to re-consult pharmacy if future dosing is needed    Thank you for the consult,      Malini Zamora, PharmD, BCPS, BCCCP 3/30/2023 10:47 AM
Placed in ps of 8 per vordeepti Parsons CNP.   03/26/23 0939   Patient Observation   Heart Rate 81   Resp 14   SpO2 98 %   Vent Information   Vent Mode CPAP/PS   Ventilator Settings   FiO2  40 %   Vt (Set, mL) 500 mL   Resp Rate (Set) 14 bmp   PEEP/CPAP (cmH2O) 5   Pressure Support (cm H2O) 8 cm H2O   Vent Patient Data (Readings)   Vt (Measured) 370 mL   Peak Inspiratory Pressure (cmH2O) 13 cmH2O   Rate Measured 12 br/min   Minute Volume (L/min) 6.13 Liters   Mean Airway Pressure (cmH2O) 10 cmH20   Plateau Pressure (cm H2O) 14 cm H2O   Driving Pressure 9   I:E Ratio 1:4.40   Vent Alarm Settings   High Pressure (cmH2O) 50 cmH2O   Low Exhaled Vt (ml) 0 mL   Additional Respiratoray Assessments   Humidification Source Heated wire   Humidification Temp 37
Pt admitted to room 4521 from PACU, report received from RN. Hemodynamic lines leveled and zeroed. Vital signs stable.
Pt continues to reach for lines and ETT when unrestrained. Right upper extremity soft wrist restraint continued at this time.
Pt pulling at ETT and IV lines despite reinforced education and redirection. Right wrist restraint application continued at this time.
Pt pulling at ETT and IV lines despite reinforced education and redirection. Right wrist restraint applied at this time.
Pt pulling at ETT and IV lines despite reinforced education and redirection. Right wrist restraint continued at this time.
Pt trialed out of R wrist restraint. Pt does not pull at lines and tubes. Pt educated on the importance of these items. Pt nods head appropriately. Right wrist restraint discontinued.
Remains sedated on vent   Afebrile , vs controlled  RRR  Lungs mostly clear  Not moving left upper extremity  Discussed with multiple family members   Continue all aggressive measures
Spoke with pharmacy re:     eptifibatide (INTEGRILIN) 0.75 mg/mL infusion   [[id number]]  Order Details  Ordered Dose: 0.5 mcg/kg/min × 73 kg (Ideal) Route: IntraVENous Frequency: CONTINUOUS @ 2.9 mL/hr         And  propofol injection   [[id number]]  Order Details  Ordered Dose: 10 mcg/kg/min × 88.5 kg Route: IntraVENous Frequency: TITRATED @ 5.3 mL/hr     Advised to Give at ordered weight listed under order.
Spontaneous Parameters performed    VT = 379 ml  f = 27  B/M  Ve = 10.6 L/M  NIF = -36  cmH2O  VC =  361 L  RSBI = 76      Performed by Luz Maria Weaver RCP
Spontaneous Parameters performed    VT = 396 ml  f = 30  B/M  Ve = 11.9 L/M  NIF = -29  cmH2O  VC = 0.4 L  RSBI = 76    Patient was unable to follow directions regarding vital capacity, best effort was 400 mL.     Performed by Jacinto Marie RCP
Subjective:      Jo Sales post of follow up  BASILAR THROMBECTOMY, Left Vertebral artery to basilar stenting    Dominant Right vert persistent occlusion     Patient is clinically stable - he is following commands,today   More awake 3/28 compared to 3/27 indicating 3% being effective          Review of Systems  Sedated and hence difficult to obtain      Objective:          Lab Review  Lab Results   Component Value Date/Time    CHOL 131 03/26/2023 05:58 AM    CHOL 143 08/23/2016 03:56 AM    CHOL 113 04/15/2012 03:35 AM    TRIG 69 03/27/2023 04:16 AM    TRIG 83 03/26/2023 05:58 AM    TRIG 71 08/23/2016 03:56 AM    HDL 41 03/26/2023 05:58 AM    HDL 39 08/23/2016 03:56 AM    HDL 38.0 04/15/2012 03:35 AM      Exam     Moving Right arm   Follows commands         Assessment:     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
Subjective:      Megan Randee post of follow up  BASILAR THROMBECTOMY, Left Vertebral artery to basilar stenting    Dominant Right vert persistent occlusion     Patient is clinically stable - he is following commands,       Given the magnitude of his stroke his MRI looks ok BUT ALSO Has several areas of infarct     He is clinically doing ok  Somewhat somnolent today       Family needs to discuss goals of care and palliative care is involved,   Has a good family support    Review of Systems  Somnolent and hence difficult to obtain      Objective:          Lab Review  Lab Results   Component Value Date/Time    CHOL 131 03/26/2023 05:58 AM    CHOL 143 08/23/2016 03:56 AM    CHOL 113 04/15/2012 03:35 AM    TRIG 69 03/27/2023 04:16 AM    TRIG 83 03/26/2023 05:58 AM    TRIG 71 08/23/2016 03:56 AM    HDL 41 03/26/2023 05:58 AM    HDL 39 08/23/2016 03:56 AM    HDL 38.0 04/15/2012 03:35 AM      Exam     Moves both legs- withdrawal only to pain not command  Moves right arm to commands   Open eyes to name calling but somnolent  Pupils reactive       Assessment:     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  S/p Intracranial stent and thrombectomy  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
Subjective:      Rebecca Mcdonald post of follow up  BASILAR THROMBECTOMY, Left Vertebral artery to basilar stenting    Dominant Right vert persistent occlusion     Patient is clinically stable - he is following commands, saw him in MRI today and talked to family afterwards      Given the magnitude of his stroke his MRI looks good, He does have several areas of stroke but a lot of his brain tissue is also preserved. Informed family .  Given his age his recovery may take a while and family was updated and questions were answered in regards to help with advance decision making    Review of Systems  Sedated and hence difficult to obtain      Objective:          Lab Review  Lab Results   Component Value Date/Time    CHOL 131 03/26/2023 05:58 AM    CHOL 143 08/23/2016 03:56 AM    CHOL 113 04/15/2012 03:35 AM    TRIG 83 03/26/2023 05:58 AM    TRIG 71 08/23/2016 03:56 AM    TRIG 51 04/15/2012 03:35 AM    HDL 41 03/26/2023 05:58 AM    HDL 39 08/23/2016 03:56 AM    HDL 38.0 04/15/2012 03:35 AM      Exam     Moves both legs today in MRI when propofol ran out,   Moves right arm to commands   Open eyes to name calling       Assessment:     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
[facility] notified of pt passing. Information given. [facility] to fax paperwork over and will call the unit if they decide pt is not a candidate. Per [facility] request do not release body to the  home until they release the hold.
Wedding ring removed by this RN, given to wife Joel Abler and taken home.
Xray at bedside   For chest and abd orders   Respiratory called for abg orders.
accessory muscles, 14/5, 291  RENAL: west to gravity, clear yellow urine  ABDOMEN: soft, nontender, nondistended, nontympanic, normal bowel sounds   SKIN/EXTREMITIES: no rashes/ecchymosis, no edema/clubbing, warm/dry, good capillary refill     Past Medical History:   Diagnosis Date    Chest pain 8/23/2016    Hyperlipidemia     Hypertension        ASSESSMENT/PLAN:       Neuro:  Left vertebral artery to basilar stenting, basilar thrombectomy. Monitor neuro status,  Neurology following, 3 %, aspirin, Brilinta   CV: No acute issues. Monitor hemodynamics. BP goal < 140. PRN hydralazine & labetalol. Statin. Pulm: Acute respiratory failure. Monitor RR & SpO2. Daily CXR. Daily ABG. GI: Dysphagia. Gastric tube. NPO. Tube feed  Monitor bowel function. Renal: Hypophosphatemia. Monitor BUN & Cr. Monitor electrolytes & replace as needed. Monitor urine output. ID: No acute issues   Endocrine: No acute issues. Monitor BS.   MSK: No acute issues. ROM. turn & reposition. Routine skin care  Heme: No acute issues. Monitor CBC. Bowel regime: Glycolax, Senna  Pain control/Sedation: fentanyl, Tylenol  DVT prophylaxis: SCDs. Heparin  GI prophylaxis: Pepcid, tube feed  Mouth/Eye care: artificial tears. Peridex. West: Keep in place for critical care monitoring of fluid balance. Family update:  Will update when they arrive   Code status:  DNR-CCA  Disposition:  ICU      Electronically signed by VIKTOR Campo CNP on 3/28/2023 at 8:45 AM
Pulmonary hygiene. Monitor RR & SpO2. O2 as needed. Encourage cough, SMI  & deep breathing. GI:   Dysphagia. BMI 28. .    Monitor bowel function. NPO. Gastric tube/  TF to be started. Zofran. Bowel regime. Renal:  No acute issues. Monitor BUN & Cr, electrolytes & replace as needed. Monitor I & O. Hunt. ID: No acute issues. Leukocytosis. Endocrine: No acute issues. Monitor BS.    MSK: No acute issues. Deconditioned.   ROM. Turn & reposition. PT & OT when able. Monitor for skin breakdown. Heme: No acute issues. Monitor CBC. Bowel regime: Bisacodyl   Pain control/Sedation: Propofol infusion, Tylenol. DVT prophylaxis: SCD and No Lovenox/Heparin at this time due to IR proccedure. GI prophylaxis: Pepcid  Glucose protocol:  ISS  Mouth/Eye care:  Artificial tears. Peridex. Hunt: Keep in place for critical care monitoring of fluid balance. Ancillary consults:   Medicine, Neurology, Interventional radiology, and Critical care  Patient/Family update: Will update when available. Code status:   Full code     Disposition: Continue ICU. Total time for caring for this patient, including direct patient contact, review of data including imaging & laboratory studies, discussions with other team members & physicians at least 30 minutes so far today. Please feel free to call with questions or concerns.       Electronically signed by Ana Bosch RN MSN APRN-NP [facility] NP  CCNS CCRN 3/26/2023 8:09 AM

## 2023-04-01 LAB
BACTERIA SPEC RESP CULT: NORMAL
SMEAR, RESPIRATORY: NORMAL

## 2023-04-01 NOTE — DISCHARGE SUMMARY
[address]                  [address] [facility]  [facility]                               DISCHARGE SUMMARY    PATIENT NAME: Vy Brandon                     :        1934  MED REC NO:   [id number]                            ROOM:       8128  ACCOUNT NO:   [de-identified]                           ADMIT DATE: 2023  PROVIDER:     Yung Robert MD                  DISCHARGE DATE:  2023    DATE OF EXPIRATION:  2023. This patient is an 44-year-old gentleman with history of hypertension,  hyperlipidemia who lives with his wife. She states that on the morning  of admission approximately 07:30 a.m., he developed onset of symptoms. Prior to that, he was just fine. He was actually feeding their pets  taking care of the normal daily activities. Beginning approximately  07:30 a.m. on the day of admission, he developed some slurred speech,  facial asymmetry, and left-sided weakness, which prompted to call for  911. He was taken to [facility] Emergency Room, where urgent  imaging studies were undertaken, which demonstrated thrombosis with near  occlusion of the distal vertebral arteries, intracranially with  extension into the proximal basilar artery. 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 Consult with Neuro  Endovascular and Neurology Telemedicine was undertaken. The patient was  taken urgently for acute bilateral vertebral artery and basilar  thrombectomy. He was followed with admission to ICU. He was intubated  on ventilator during the procedure for respiratory failure. He was seen  in consult by Neuro, Critical Care and later Palliative Medicine and  Neurology as well. The patient remained intubated and sedated. He had  some improvement with improving responsiveness, but ongoing left  hemiplegia was noted. The patient was begun on 3% normal saline and  hypernatremia was achieved.   After

## 2023-04-04 LAB
BACTERIA BLD CULT ORG #2: NORMAL
BACTERIA BLD CULT: NORMAL